# Patient Record
Sex: MALE | Race: WHITE | NOT HISPANIC OR LATINO | Employment: FULL TIME | URBAN - METROPOLITAN AREA
[De-identification: names, ages, dates, MRNs, and addresses within clinical notes are randomized per-mention and may not be internally consistent; named-entity substitution may affect disease eponyms.]

---

## 2018-07-07 PROBLEM — E29.1 MALE HYPOGONADISM: Status: ACTIVE | Noted: 2018-01-11

## 2018-07-11 ENCOUNTER — OFFICE VISIT (OUTPATIENT)
Dept: FAMILY MEDICINE CLINIC | Facility: CLINIC | Age: 53
End: 2018-07-11
Payer: COMMERCIAL

## 2018-07-11 VITALS
TEMPERATURE: 97.8 F | BODY MASS INDEX: 27.16 KG/M2 | DIASTOLIC BLOOD PRESSURE: 70 MMHG | WEIGHT: 194 LBS | SYSTOLIC BLOOD PRESSURE: 104 MMHG | HEART RATE: 75 BPM | OXYGEN SATURATION: 98 % | HEIGHT: 71 IN

## 2018-07-11 DIAGNOSIS — E78.2 MIXED HYPERLIPIDEMIA: Primary | ICD-10-CM

## 2018-07-11 DIAGNOSIS — E29.1 MALE HYPOGONADISM: ICD-10-CM

## 2018-07-11 DIAGNOSIS — I10 ESSENTIAL HYPERTENSION: ICD-10-CM

## 2018-07-11 PROBLEM — E66.3 OVERWEIGHT (BMI 25.0-29.9): Status: ACTIVE | Noted: 2018-07-11

## 2018-07-11 PROCEDURE — 3074F SYST BP LT 130 MM HG: CPT | Performed by: FAMILY MEDICINE

## 2018-07-11 PROCEDURE — 3078F DIAST BP <80 MM HG: CPT | Performed by: FAMILY MEDICINE

## 2018-07-11 PROCEDURE — 99214 OFFICE O/P EST MOD 30 MIN: CPT | Performed by: FAMILY MEDICINE

## 2018-07-11 RX ORDER — TESTOSTERONE 12.5 MG/1.25G
GEL TOPICAL
COMMUNITY
Start: 2018-06-24 | End: 2020-09-28 | Stop reason: SDUPTHER

## 2018-07-11 RX ORDER — ATORVASTATIN CALCIUM 20 MG/1
TABLET, FILM COATED ORAL
COMMUNITY
Start: 2018-03-19 | End: 2018-09-01 | Stop reason: SDUPTHER

## 2018-07-11 RX ORDER — LISINOPRIL 5 MG/1
TABLET ORAL EVERY 24 HOURS
COMMUNITY
Start: 2018-01-11 | End: 2018-07-11 | Stop reason: ALTCHOICE

## 2018-07-11 NOTE — ASSESSMENT & PLAN NOTE
Chronic with controlled continue current medication  Patient been taken half tablet of the lisinopril and the blood pressure 104/70 we will stop the lisinopril   low-salt diet less than 2 g a day ,   low caffeine intake   regular aerobic exercise 20 to totally minute a day diet and important lose weight discussed with the patient

## 2018-07-11 NOTE — ASSESSMENT & PLAN NOTE
Chronic ,fair control on current medication    Advised to maintain a low-fat low-cholesterol diet consult regarding potential comorbidity including cardiovascular disease consult regarding important weight loss

## 2018-07-11 NOTE — PROGRESS NOTES
Assessment/Plan:    Essential hypertension  Chronic with controlled continue current medication  Patient been taken half tablet of the lisinopril and the blood pressure 104/70 we will stop the lisinopril   low-salt diet less than 2 g a day ,   low caffeine intake   regular aerobic exercise 20 to totally minute a day diet and important lose weight discussed with the patient      Hyperlipidemia  Chronic ,fair control on current medication  Advised to maintain a low-fat low-cholesterol diet consult regarding potential comorbidity including cardiovascular disease consult regarding important weight loss      Male hypogonadism  Chronic well-controlled on testosterone supplement    Overweight (BMI 25 0-29  9)  Patient lost 3 lb from the last visit we encouraged to continue his lifestyle modification       Diagnoses and all orders for this visit:    Mixed hyperlipidemia  -     CBC and differential; Future  -     Comprehensive metabolic panel; Future  -     Lipid panel; Future  -     TSH baseline; Future    Essential hypertension  -     CBC and differential; Future  -     Comprehensive metabolic panel; Future  -     Lipid panel; Future  -     TSH baseline; Future    Male hypogonadism  -     CBC and differential; Future  -     Comprehensive metabolic panel; Future  -     Lipid panel; Future  -     TSH baseline; Future    Other orders  -     atorvastatin (LIPITOR) 20 mg tablet; TAKE ONE TABLET BY MOUTH EVERY DAY  -     Discontinue: lisinopril (ZESTRIL) 5 mg tablet; every 24 hours  -     Testosterone 12 5 MG/ACT (1%) GEL; Subjective:   Chief Complaint   Patient presents with    Follow-up     chronic condition        Patient ID: Candace Kim is a 46 y o  male      Patient here for follow-up is a chronic condition he had history of hyperlipidemia on atorvastatin 20 mg asymptomatic denies any chest pain short of breath no palpitation The patient has long history of hypertension the blood pressure today is in control patient tolerates medication well and no chest pain or short of breath no palpitation no headache patient has been working on his diet and exercise patient lost 3 lb from the last visit patient was hypogonadism on testosterone supplement and may tolerate med no side effect        The following portions of the patient's history were reviewed and updated as appropriate: allergies, current medications, past family history, past medical history, past social history, past surgical history and problem list     Review of Systems   Constitutional: Negative for fatigue and fever  HENT: Negative for ear pain, sinus pain, sinus pressure and sore throat  Eyes: Negative for pain and redness  Respiratory: Negative for cough, chest tightness and shortness of breath  Cardiovascular: Negative for chest pain, palpitations and leg swelling  Gastrointestinal: Negative for abdominal pain, blood in stool, constipation, diarrhea and nausea  Genitourinary: Negative for flank pain, frequency and hematuria  Musculoskeletal: Negative for back pain and joint swelling  Skin: Negative for rash  Neurological: Negative for dizziness, numbness and headaches  Hematological: Does not bruise/bleed easily  Objective:  Vitals:    07/11/18 0811   BP: 104/70   BP Location: Left arm   Patient Position: Sitting   Cuff Size: Large   Pulse: 75   Temp: 97 8 °F (36 6 °C)   TempSrc: Oral   SpO2: 98%   Weight: 88 kg (194 lb)   Height: 5' 11" (1 803 m)      Physical Exam   Constitutional: He is oriented to person, place, and time  He appears well-developed and well-nourished  HENT:   Head: Normocephalic  Right Ear: External ear normal    Left Ear: External ear normal    Eyes: Right eye exhibits no discharge  Left eye exhibits no discharge  Neck: No JVD present  Cardiovascular: Normal rate, regular rhythm and normal heart sounds  Exam reveals no gallop  No murmur heard    Pulmonary/Chest: Effort normal  No respiratory distress  He has no wheezes  He has no rales  He exhibits no tenderness  Abdominal: He exhibits no mass  There is no tenderness  There is no rebound  Musculoskeletal: He exhibits no edema or tenderness  Neurological: He is alert and oriented to person, place, and time

## 2018-07-11 NOTE — PATIENT INSTRUCTIONS

## 2018-09-01 DIAGNOSIS — E78.2 MIXED HYPERLIPIDEMIA: Primary | ICD-10-CM

## 2018-09-03 RX ORDER — ATORVASTATIN CALCIUM 20 MG/1
TABLET, FILM COATED ORAL
Qty: 90 TABLET | Refills: 0 | Status: SHIPPED | OUTPATIENT
Start: 2018-09-03 | End: 2018-12-02 | Stop reason: SDUPTHER

## 2018-09-27 ENCOUNTER — OFFICE VISIT (OUTPATIENT)
Dept: FAMILY MEDICINE CLINIC | Facility: CLINIC | Age: 53
End: 2018-09-27
Payer: COMMERCIAL

## 2018-09-27 VITALS
OXYGEN SATURATION: 97 % | DIASTOLIC BLOOD PRESSURE: 78 MMHG | HEIGHT: 71 IN | SYSTOLIC BLOOD PRESSURE: 112 MMHG | BODY MASS INDEX: 26.74 KG/M2 | TEMPERATURE: 98.3 F | RESPIRATION RATE: 16 BRPM | HEART RATE: 74 BPM | WEIGHT: 191 LBS

## 2018-09-27 DIAGNOSIS — Z23 NEED FOR INFLUENZA VACCINATION: ICD-10-CM

## 2018-09-27 DIAGNOSIS — F41.8 SITUATIONAL ANXIETY: ICD-10-CM

## 2018-09-27 DIAGNOSIS — Z00.01 ENCOUNTER FOR ROUTINE ADULT HEALTH EXAMINATION WITH ABNORMAL FINDINGS: Primary | ICD-10-CM

## 2018-09-27 PROCEDURE — 90471 IMMUNIZATION ADMIN: CPT

## 2018-09-27 PROCEDURE — 99396 PREV VISIT EST AGE 40-64: CPT | Performed by: FAMILY MEDICINE

## 2018-09-27 PROCEDURE — 90682 RIV4 VACC RECOMBINANT DNA IM: CPT

## 2018-09-27 RX ORDER — ALPRAZOLAM 0.25 MG/1
0.25 TABLET ORAL
Qty: 30 TABLET | Refills: 0 | Status: SHIPPED | OUTPATIENT
Start: 2018-09-27 | End: 2019-01-28 | Stop reason: ALTCHOICE

## 2018-09-27 NOTE — ASSESSMENT & PLAN NOTE
Symptomatic start exam neck is 0 25 mg 1 tablet p r n  proper use of medication possible side effect discussed with the patient

## 2018-09-27 NOTE — PROGRESS NOTES
[facility] HEALTH MAINTENANCE OFFICE VISIT  [facility] [place] PRIMARY CARE [facility]    NAME: Nain Mendez  AGE: 46 y o  SEX: male  : 1965     DATE: 2018    Assessment and Plan     Problem List Items Addressed This Visit     Situational anxiety     Symptomatic start exam neck is 0 25 mg 1 tablet p r n  proper use of medication possible side effect discussed with the patient         Relevant Medications    ALPRAZolam (XANAX) 0 25 mg tablet      Other Visit Diagnoses     Encounter for routine adult health examination with abnormal findings    -  Primary    The patient will have a flu shot today recommend will hydration increased activity sunscreen    Need for influenza vaccination        Relevant Orders    influenza vaccine, [id number], quadrivalent, recombinant, PF, 0 5 mL, for patients 18 yr+ (FLUBLOK) (Completed)    BMI 26 0-26 9,adult        The uncontrolled will recommend patient healthy diet and increase activity 30 minutes a day 5 days a week            · Patient Counseling:   · Nutrition: Stressed importance of a well balanced diet, moderation of sodium/saturated fat, caloric balance and sufficient intake of fiber  · Exercise: Stressed the importance of regular exercise with a goal of 150 minutes per week  · Dental Health: Discussed daily flossing and brushing and regular dental visits     · Immunizations reviewed patient due for flu shot  · Discussed benefits of screening patient is up-to-date with colonoscopy  · Discussed the patient's BMI with him    The BMI is above average; BMI management plan is completed            Chief Complaint     Chief Complaint   Patient presents with    Physical Exam     Physical Exam        History of Present Illness     Patient here for his annual exam patient deny any chest pain short of breath no palpitation no headache no blurred vision no weakness or lateralized of the symptom no abdomen pain nausea vomiting or diarrhea no renal problem no rash no fever no change in the weight patient he is not smoker he is up-to-date with colonoscopy has been done in December 2017 the been told is good for 10 years  Patient is been going through some stressful the time of his life affecting his sleeping he is going through divorce and patient having tough time to fall asleep and when he sleeps he get up a couple times during the night he deny any any suicidal attempt or idea and deny any tremor no jittery no palpitation no sweating        Well Adult Physical   Patient here for a comprehensive physical exam       Diet and Physical Activity  Diet: well balanced diet  Weight concerns: Patient is overweight (BMI 25 0-29  9)  Exercise: infrequently      Depression Screen  PHQ-9 Depression Screening    PHQ-9:    Frequency of the following problems over the past two weeks:       Little interest or pleasure in doing things:  0 - not at all  Feeling down, depressed, or hopeless:  0 - not at all  PHQ-2 Score:  0          General Health  Hearing: Normal:  bilateral  Vision: wears glasses  Dental: regular dental visits        The following portions of the patient's history were reviewed and updated as appropriate: allergies, current medications, past family history, past medical history, past social history, past surgical history and problem list     Review of Systems     Review of Systems   Constitutional: Negative for fatigue and fever  HENT: Negative for ear pain, sinus pain, sinus pressure and sore throat  Eyes: Negative for pain and redness  Respiratory: Negative for cough, chest tightness and shortness of breath  Cardiovascular: Negative for chest pain, palpitations and leg swelling  Gastrointestinal: Negative for abdominal pain, blood in stool, constipation, diarrhea and nausea  Genitourinary: Negative for flank pain, frequency and hematuria  Musculoskeletal: Negative for back pain and joint swelling  Skin: Negative for rash  Neurological: Negative for dizziness, numbness and headaches  Hematological: Does not bruise/bleed easily  Psychiatric/Behavioral: Negative for behavioral problems  Past Medical History     Past Medical History:   Diagnosis Date    Allergic     Hypertension     Situational anxiety 9/27/2018       Past Surgical History     Past Surgical History:   Procedure Laterality Date    ANTERIOR CRUCIATE LIGAMENT REPAIR Left     SHOULDER SURGERY Right     R SHOULDER IMPINGEMENT SURGERY       Social History     Social History     Social History    Marital status: Single     Spouse name: N/A    Number of children: N/A    Years of education: N/A     Social History Main Topics    Smoking status: Never Smoker    Smokeless tobacco: Never Used    Alcohol use Yes    Drug use: No    Sexual activity: Not Asked     Other Topics Concern    None     Social History Narrative    MARITAL STATUS:  AS PER NEXTGEN    HAS CHILDREN    HAND DOMINANCE: RIGHT       Family History     Family History   Problem Relation Age of Onset    Hypertension Mother     Hyperlipidemia Mother     Colon cancer Father        Current Medications       Current Outpatient Prescriptions:     ALPRAZolam (XANAX) 0 25 mg tablet, Take 1 tablet (0 25 mg total) by mouth daily at bedtime as needed for anxiety, Disp: 30 tablet, Rfl: 0    atorvastatin (LIPITOR) 20 mg tablet, TAKE 1 TABLET DAILY, Disp: 90 tablet, Rfl: 0    Multiple Vitamin (MULTI-VITAMIN DAILY PO), Take 1 capsule by mouth, Disp: , Rfl:     Testosterone 12 5 MG/ACT (1%) GEL, , Disp: , Rfl:      Allergies     Allergies   Allergen Reactions    No Known Allergies        Objective     /78 (BP Location: Left arm, Patient Position: Sitting, Cuff Size: Large)   Pulse 74   Temp 98 3 °F (36 8 °C) (Oral)   Resp 16   Ht 5' 11" (1 803 m)   Wt 86 6 kg (191 lb)   SpO2 97%   BMI 26 64 kg/m²      Physical Exam   Constitutional: He is oriented to person, place, and time   He appears well-developed and well-nourished  HENT:   Head: Normocephalic  Right Ear: External ear normal    Left Ear: External ear normal    Eyes: Conjunctivae and EOM are normal  Right eye exhibits no discharge  Left eye exhibits no discharge  Neck: No JVD present  Cardiovascular: Normal rate, regular rhythm and normal heart sounds  Exam reveals no gallop  No murmur heard  Pulmonary/Chest: Effort normal  No respiratory distress  He has no wheezes  He has no rales  He exhibits no tenderness  Abdominal: He exhibits no mass  There is no tenderness  There is no rebound  Musculoskeletal: He exhibits no edema or tenderness  Neurological: He is alert and oriented to person, place, and time  Skin: No rash noted  No erythema             Health Maintenance     Health Maintenance   Topic Date Due    INFLUENZA VACCINE  09/01/2018    Depression Screening PHQ  07/11/2019    CRC Screening: Colonoscopy  12/27/2022    DTaP,Tdap,and Td Vaccines (2 - Td) 08/08/2026     Immunization History   Administered Date(s) Administered    Influenza 10/11/2017    Influenza, recombinant, quadrivalent,injectable, preservative free 09/27/2018    Tdap 08/08/2016       Aaliyah Nieto MD  [address] [place] [address]

## 2018-09-27 NOTE — PATIENT INSTRUCTIONS

## 2018-10-03 DIAGNOSIS — I10 ESSENTIAL HYPERTENSION: Primary | ICD-10-CM

## 2018-10-03 RX ORDER — LISINOPRIL 10 MG/1
TABLET ORAL
Qty: 90 TABLET | Refills: 1 | Status: SHIPPED | OUTPATIENT
Start: 2018-10-03 | End: 2019-01-28 | Stop reason: ALTCHOICE

## 2018-12-02 DIAGNOSIS — E78.2 MIXED HYPERLIPIDEMIA: ICD-10-CM

## 2018-12-02 RX ORDER — ATORVASTATIN CALCIUM 20 MG/1
TABLET, FILM COATED ORAL
Qty: 90 TABLET | Refills: 0 | Status: SHIPPED | OUTPATIENT
Start: 2018-12-02 | End: 2019-03-02 | Stop reason: SDUPTHER

## 2019-01-28 ENCOUNTER — OFFICE VISIT (OUTPATIENT)
Dept: FAMILY MEDICINE CLINIC | Facility: CLINIC | Age: 54
End: 2019-01-28
Payer: COMMERCIAL

## 2019-01-28 VITALS
BODY MASS INDEX: 27.58 KG/M2 | WEIGHT: 197 LBS | SYSTOLIC BLOOD PRESSURE: 114 MMHG | HEART RATE: 73 BPM | TEMPERATURE: 97.5 F | DIASTOLIC BLOOD PRESSURE: 72 MMHG | HEIGHT: 71 IN | RESPIRATION RATE: 16 BRPM

## 2019-01-28 DIAGNOSIS — I10 ESSENTIAL HYPERTENSION: ICD-10-CM

## 2019-01-28 DIAGNOSIS — E87.5 HYPERKALEMIA: ICD-10-CM

## 2019-01-28 DIAGNOSIS — R10.13 DYSPEPSIA: Primary | ICD-10-CM

## 2019-01-28 DIAGNOSIS — E78.5 HYPERLIPIDEMIA, UNSPECIFIED HYPERLIPIDEMIA TYPE: ICD-10-CM

## 2019-01-28 DIAGNOSIS — Z11.59 NEED FOR HEPATITIS C SCREENING TEST: ICD-10-CM

## 2019-01-28 DIAGNOSIS — E29.1 MALE HYPOGONADISM: ICD-10-CM

## 2019-01-28 PROCEDURE — 3074F SYST BP LT 130 MM HG: CPT | Performed by: FAMILY MEDICINE

## 2019-01-28 PROCEDURE — 3078F DIAST BP <80 MM HG: CPT | Performed by: FAMILY MEDICINE

## 2019-01-28 PROCEDURE — 3008F BODY MASS INDEX DOCD: CPT | Performed by: FAMILY MEDICINE

## 2019-01-28 PROCEDURE — 1036F TOBACCO NON-USER: CPT | Performed by: FAMILY MEDICINE

## 2019-01-28 PROCEDURE — 99214 OFFICE O/P EST MOD 30 MIN: CPT | Performed by: FAMILY MEDICINE

## 2019-01-28 NOTE — ASSESSMENT & PLAN NOTE
Chronic well controlled continue current diet patient off of lisinopril blood pressure well controlled low-salt diet less than 2 g a day ,   low caffeine intake   regular aerobic exercise 20 to totally minute a day diet and important lose weight discussed with the patient

## 2019-01-28 NOTE — PROGRESS NOTES
Subjective:   Chief Complaint   Patient presents with    Follow-up     chronic conditions        Patient ID: Benja Woo is a 46 y o  male  Patient here follow-up with a chronic congestion and the concerned about the discomfort in the epigastric area sometimes happen after certain food spicy food and the start burping and bloating patient does also drink loss of caffeine up to 10 cups a day and he deny any nausea vomiting no diarrhea no weight change and no fever  The patient was history of hyperlipidemia on atorvastatin 20 mg once a day tolerated well without any rash or muscle pain deny any chest pain short of breath no palpitation no headache no blurred vision no weakness or lateralized of the symptom patient's history of hypertension he is off of the lisinopril blood pressure was well control asymptomatic no chest pain no short of breast and no headache recent blood work discussed with the patient        The following portions of the patient's history were reviewed and updated as appropriate: allergies, current medications, past family history, past medical history, past social history, past surgical history and problem list     Review of Systems   Constitutional: Negative for fatigue and fever  HENT: Negative for ear pain, sinus pain, sinus pressure and sore throat  Eyes: Negative for pain and redness  Respiratory: Negative for cough, chest tightness and shortness of breath  Cardiovascular: Negative for chest pain, palpitations and leg swelling  Gastrointestinal: Negative for abdominal pain, blood in stool, constipation, diarrhea and nausea  Genitourinary: Negative for flank pain, frequency and hematuria  Musculoskeletal: Negative for back pain and joint swelling  Skin: Negative for rash  Neurological: Negative for dizziness, numbness and headaches  Hematological: Does not bruise/bleed easily           Objective:  Vitals:    01/28/19 0731   BP: 114/72   BP Location: Left arm Patient Position: Sitting   Cuff Size: Large   Pulse: 73   Resp: 16   Temp: 97 5 °F (36 4 °C)   TempSrc: Oral   Weight: 89 4 kg (197 lb)   Height: 5' 11" (1 803 m)      Physical Exam   Constitutional: He is oriented to person, place, and time  He appears well-developed and well-nourished  HENT:   Head: Normocephalic  Right Ear: External ear normal    Left Ear: External ear normal    Eyes: Conjunctivae and EOM are normal  Right eye exhibits no discharge  Left eye exhibits no discharge  Neck: No JVD present  Cardiovascular: Normal rate, regular rhythm and normal heart sounds  Exam reveals no gallop  No murmur heard  Pulmonary/Chest: Effort normal  No respiratory distress  He has no wheezes  He has no rales  He exhibits no tenderness  Abdominal: He exhibits no mass  There is no tenderness  There is no rebound  Musculoskeletal: He exhibits no edema or tenderness  Neurological: He is alert and oriented to person, place, and time  Skin: No rash noted  No erythema  Assessment/Plan:    Essential hypertension  Chronic well controlled continue current diet patient off of lisinopril blood pressure well controlled low-salt diet less than 2 g a day ,   low caffeine intake   regular aerobic exercise 20 to totally minute a day diet and important lose weight discussed with the patient      Hyperlipidemia  Chronic ,fair control on current medication    Atorvastatin 20 mg once a day  Advised to maintain a low-fat low-cholesterol diet consult regarding potential comorbidity including cardiovascular disease consult regarding important weight loss      Dyspepsia  Symptomatic new onset we discussed with the patient the to take Tums the at the time of the symptom  Discuss with pt important lose weight   Multiple small meal ,avoid eat and lying down ,avoid spicy food and avoid provoked food  If symptom persistent to call  office         Male hypogonadism  Chronic fair control currently on testosterone gel tolerated well without any side effects continue current management       Diagnoses and all orders for this visit:    Dyspepsia  -     CBC and differential; Future  -     Comprehensive metabolic panel; Future  -     Lipid panel; Future  -     TSH, 3rd generation with Free T4 reflex; Future    Essential hypertension  -     CBC and differential; Future  -     Comprehensive metabolic panel; Future  -     Lipid panel; Future  -     TSH, 3rd generation with Free T4 reflex; Future    Need for hepatitis C screening test  -     Hepatitis C antibody; Future    Male hypogonadism  -     CBC and differential; Future  -     Comprehensive metabolic panel; Future  -     Lipid panel; Future  -     TSH, 3rd generation with Free T4 reflex; Future    Hyperkalemia  -     CBC and differential; Future  -     Comprehensive metabolic panel; Future  -     Lipid panel; Future  -     TSH, 3rd generation with Free T4 reflex; Future    Hyperlipidemia, unspecified hyperlipidemia type  -     CBC and differential; Future  -     Comprehensive metabolic panel; Future  -     Lipid panel; Future  -     TSH, 3rd generation with Free T4 reflex;  Future

## 2019-01-28 NOTE — ASSESSMENT & PLAN NOTE
Symptomatic new onset we discussed with the patient the to take Tums the at the time of the symptom  Discuss with pt important lose weight   Multiple small meal ,avoid eat and lying down ,avoid spicy food and avoid provoked food  If symptom persistent to call  office

## 2019-01-28 NOTE — ASSESSMENT & PLAN NOTE
Chronic ,fair control on current medication    Atorvastatin 20 mg once a day  Advised to maintain a low-fat low-cholesterol diet consult regarding potential comorbidity including cardiovascular disease consult regarding important weight loss

## 2019-01-28 NOTE — ASSESSMENT & PLAN NOTE
Chronic fair control currently on testosterone gel tolerated well without any side effects continue current management

## 2019-03-02 DIAGNOSIS — E78.2 MIXED HYPERLIPIDEMIA: ICD-10-CM

## 2019-03-03 RX ORDER — ATORVASTATIN CALCIUM 20 MG/1
TABLET, FILM COATED ORAL
Qty: 90 TABLET | Refills: 0 | Status: SHIPPED | OUTPATIENT
Start: 2019-03-03 | End: 2019-05-31 | Stop reason: SDUPTHER

## 2019-04-01 RX ORDER — LISINOPRIL 10 MG/1
TABLET ORAL
Qty: 90 TABLET | Refills: 1 | OUTPATIENT
Start: 2019-04-01

## 2019-05-31 DIAGNOSIS — E78.2 MIXED HYPERLIPIDEMIA: ICD-10-CM

## 2019-05-31 RX ORDER — ATORVASTATIN CALCIUM 20 MG/1
TABLET, FILM COATED ORAL
Qty: 90 TABLET | Refills: 0 | Status: SHIPPED | OUTPATIENT
Start: 2019-05-31 | End: 2019-08-29 | Stop reason: SDUPTHER

## 2019-08-29 DIAGNOSIS — E78.2 MIXED HYPERLIPIDEMIA: ICD-10-CM

## 2019-08-29 RX ORDER — ATORVASTATIN CALCIUM 20 MG/1
TABLET, FILM COATED ORAL
Qty: 90 TABLET | Refills: 0 | Status: SHIPPED | OUTPATIENT
Start: 2019-08-29 | End: 2019-11-27 | Stop reason: SDUPTHER

## 2019-09-09 LAB — HCV AB SER-ACNC: NEGATIVE

## 2019-09-27 ENCOUNTER — OFFICE VISIT (OUTPATIENT)
Dept: FAMILY MEDICINE CLINIC | Facility: CLINIC | Age: 54
End: 2019-09-27
Payer: COMMERCIAL

## 2019-09-27 VITALS
TEMPERATURE: 96.6 F | OXYGEN SATURATION: 96 % | BODY MASS INDEX: 28.28 KG/M2 | RESPIRATION RATE: 16 BRPM | HEART RATE: 59 BPM | DIASTOLIC BLOOD PRESSURE: 62 MMHG | HEIGHT: 71 IN | SYSTOLIC BLOOD PRESSURE: 124 MMHG | WEIGHT: 202 LBS

## 2019-09-27 DIAGNOSIS — Z23 NEED FOR INFLUENZA VACCINATION: ICD-10-CM

## 2019-09-27 DIAGNOSIS — Z00.01 ENCOUNTER FOR WELL ADULT EXAM WITH ABNORMAL FINDINGS: Primary | ICD-10-CM

## 2019-09-27 DIAGNOSIS — Z80.0 FAMILY HISTORY OF COLON CANCER IN FATHER: ICD-10-CM

## 2019-09-27 DIAGNOSIS — M25.562 CHRONIC PAIN OF LEFT KNEE: ICD-10-CM

## 2019-09-27 DIAGNOSIS — E78.2 MIXED HYPERLIPIDEMIA: ICD-10-CM

## 2019-09-27 DIAGNOSIS — I10 ESSENTIAL HYPERTENSION: ICD-10-CM

## 2019-09-27 DIAGNOSIS — R10.13 DYSPEPSIA: ICD-10-CM

## 2019-09-27 DIAGNOSIS — G89.29 CHRONIC PAIN OF LEFT KNEE: ICD-10-CM

## 2019-09-27 PROCEDURE — 90471 IMMUNIZATION ADMIN: CPT | Performed by: FAMILY MEDICINE

## 2019-09-27 PROCEDURE — 3074F SYST BP LT 130 MM HG: CPT | Performed by: FAMILY MEDICINE

## 2019-09-27 PROCEDURE — 90686 IIV4 VACC NO PRSV 0.5 ML IM: CPT | Performed by: FAMILY MEDICINE

## 2019-09-27 PROCEDURE — 99396 PREV VISIT EST AGE 40-64: CPT | Performed by: FAMILY MEDICINE

## 2019-09-27 PROCEDURE — 3078F DIAST BP <80 MM HG: CPT | Performed by: FAMILY MEDICINE

## 2019-09-27 PROCEDURE — 3008F BODY MASS INDEX DOCD: CPT | Performed by: FAMILY MEDICINE

## 2019-09-27 PROCEDURE — 99214 OFFICE O/P EST MOD 30 MIN: CPT | Performed by: FAMILY MEDICINE

## 2019-09-27 NOTE — ASSESSMENT & PLAN NOTE
Advice and education were given regarding nutrition, aerobic exercises, weight bearing exercises, cardiovascular risk reduction, fall risk reduction, and age appropriate supplements  The patient was counseled regarding instructions for management, risk factor reductions, prognosis, risks and benefits of treatment options, patient and family education, and importance of compliance with treatment     Patient is up-to-date with the colonoscopy screening for colon cancer also patient is up-to-date with his PSA follow-up by Urology

## 2019-09-27 NOTE — PATIENT INSTRUCTIONS

## 2019-09-27 NOTE — ASSESSMENT & PLAN NOTE
Chronic asymptomatic fair control with the low-salt diet encouraged patient to continue and important lose weight increased physical activity

## 2019-09-27 NOTE — ASSESSMENT & PLAN NOTE
Chronic asymptomatic fair control with current diet encouraged patient the avoid provoke food do not eat and lie down

## 2019-09-27 NOTE — ASSESSMENT & PLAN NOTE
Chronic asymptomatic fair control continue current dose of statin encouraged patient to follow up with the low-fat diet and important lose weight

## 2019-09-27 NOTE — PROGRESS NOTES
FAMILY PRACTICE HEALTH MAINTENANCE OFFICE VISIT  [facility] [place] [facility]    NAME: Brenda Herrera  AGE: 53 y o  SEX: male  : 1965     DATE: 2019    Assessment and Plan     1  Encounter for well adult exam with abnormal findings  Assessment & Plan:  Advice and education were given regarding nutrition, aerobic exercises, weight bearing exercises, cardiovascular risk reduction, fall risk reduction, and age appropriate supplements  The patient was counseled regarding instructions for management, risk factor reductions, prognosis, risks and benefits of treatment options, patient and family education, and importance of compliance with treatment  Patient is up-to-date with the colonoscopy screening for colon cancer also patient is up-to-date with his PSA follow-up by Urology        2  Chronic pain of left knee  Assessment & Plan:  New diagnosis symptomatic start the Voltaren gel proper use discussed with the patient will do x-ray of the left knee discussed the patient important lose weight    Orders:  -     diclofenac sodium (VOLTAREN) 1 %; Apply 2 g topically 4 (four) times a day  -     XR knee 3 vw left non injury; Future; Expected date: 2019    3  Mixed hyperlipidemia  Assessment & Plan:  Chronic asymptomatic fair control continue current dose of statin encouraged patient to follow up with the low-fat diet and important lose weight    Orders:  -     CBC and differential; Future; Expected date: 2020  -     Comprehensive metabolic panel; Future; Expected date: 2020  -     Lipid panel; Future; Expected date: 2020  -     TSH, 3rd generation with Free T4 reflex; Future; Expected date: 2020  -     XR knee 3 vw left non injury; Future; Expected date: 2019    4   Dyspepsia  Assessment & Plan:  Chronic asymptomatic fair control with current diet encouraged patient the avoid provoke food do not eat and lie down    Orders:  - CBC and differential; Future; Expected date: 03/09/2020  -     Comprehensive metabolic panel; Future; Expected date: 03/09/2020  -     Lipid panel; Future; Expected date: 03/09/2020  -     TSH, 3rd generation with Free T4 reflex; Future; Expected date: 03/09/2020  -     XR knee 3 vw left non injury; Future; Expected date: 09/27/2019    5  Essential hypertension  Assessment & Plan:  Chronic asymptomatic fair control with the low-salt diet encouraged patient to continue and important lose weight increased physical activity    Orders:  -     CBC and differential; Future; Expected date: 03/09/2020  -     Comprehensive metabolic panel; Future; Expected date: 03/09/2020  -     Lipid panel; Future; Expected date: 03/09/2020  -     TSH, 3rd generation with Free T4 reflex; Future; Expected date: 03/09/2020  -     XR knee 3 vw left non injury; Future; Expected date: 09/27/2019    6  BMI 28 0-28 9,adult  Assessment & Plan:  The BMI is above average  BMI counseling and education was provided to the patient  Nutrition recommendations include reducing portion sizes, decreasing overall calorie intake, 3-5 servings of fruits/vegetables daily, reducing fast food intake, consuming healthier snacks, decreasing soda and/or juice intake, moderation in carbohydrate intake and reducing intake of saturated fat and trans fat  Exercise recommendations include moderate aerobic physical activity for 150 minutes/week, exercising 3-5 times per week and joining a gym  7  Family history of colon cancer in father    6   Need for influenza vaccination  -     FLUZONE: influenza vaccine, quadrivalent, 0 5 mL          · Patient Counseling:   · Nutrition: Stressed importance of a well balanced diet, moderation of sodium/saturated fat, caloric balance and sufficient intake of fiber  · Exercise: Stressed the importance of regular exercise with a goal of 150 minutes per week  · Dental Health: Discussed daily flossing and brushing and regular dental visits     · Immunizations reviewed: Risks and Benefits discussed  · Discussed benefits of:  Colon Cancer Screening and Prostate Cancer Screening    BMI Counseling: Body mass index is 28 17 kg/m²  Discussed with patient's BMI with him  Chief Complaint     Chief Complaint   Patient presents with    Physical Exam     Yearly Physical Exam     Follow-up     chronic conditions       History of Present Illness     The patient here for his annual physical exam follow-up with a chronic condition  Patient deny any chest pain short of breath no palpitation no cough no wheezing and no headache no numbness no weakness or lateralized of the symptom deny any abdomen pain nausea vomiting or diarrhea no renal problem no rash no fever no change in the weight and no change in the mood patient is not smoker the patient try to eat healthy diet and he does do exercise 2 to 3 times a week the patient does family history of colon cancer he already had a colonoscopy      Well Adult Physical   Patient here for a comprehensive physical exam       Diet and Physical Activity  Diet: Regular diet  Exercise: frequently      Depression Screen  PHQ-9 Depression Screening    PHQ-9:    Frequency of the following problems over the past two weeks:       Little interest or pleasure in doing things:  0 - not at all  Feeling down, depressed, or hopeless:  0 - not at all  PHQ-2 Score:  0          General Health  Hearing: Normal:  bilateral  Vision: no vision problems  Dental: regular dental visits    The following portions of the patient's history were reviewed and updated as appropriate: allergies, current medications, past family history, past medical history, past social history, past surgical history and problem list     Review of Systems     Review of Systems   Constitutional: Negative for fatigue and fever  HENT: Negative for ear pain, sinus pressure, sinus pain and sore throat  Eyes: Negative for pain and redness     Respiratory: Negative for cough, chest tightness and shortness of breath  Cardiovascular: Negative for chest pain, palpitations and leg swelling  Gastrointestinal: Negative for abdominal pain, blood in stool, constipation, diarrhea and nausea  Endocrine: Negative for polydipsia  Genitourinary: Negative for flank pain, frequency and hematuria  Musculoskeletal: Positive for arthralgias  Negative for back pain and joint swelling  Left knee pain   Skin: Negative for rash  Neurological: Negative for dizziness, numbness and headaches  Hematological: Does not bruise/bleed easily  Psychiatric/Behavioral: Negative for agitation and behavioral problems         Past Medical History     Past Medical History:   Diagnosis Date    Allergic     Hypertension     Situational anxiety 9/27/2018       Past Surgical History     Past Surgical History:   Procedure Laterality Date    ANTERIOR CRUCIATE LIGAMENT REPAIR Left     SHOULDER SURGERY Right     R SHOULDER IMPINGEMENT SURGERY       Social History     Social History     Socioeconomic History    Marital status: Single     Spouse name: None    Number of children: None    Years of education: None    Highest education level: None   Occupational History    None   Social Needs    Financial resource strain: Not hard at all   Shamar-Laura insecurity:     Worry: Never true     Inability: Never true    Transportation needs:     Medical: No     Non-medical: No   Tobacco Use    Smoking status: Never Smoker    Smokeless tobacco: Never Used   Substance and Sexual Activity    Alcohol use: Yes     Frequency: Monthly or less     Drinks per session: 1 or 2     Binge frequency: Never     Comment: occasional    Drug use: No    Sexual activity: Yes     Partners: Female   Lifestyle    Physical activity:     Days per week: 4 days     Minutes per session: 60 min    Stress: Not at all   Relationships    Social connections:     Talks on phone: More than three times a week     Gets together: More than three times a week     Attends [employer] service: More than 4 times per year     Active member of club or organization: No     Attends meetings of clubs or organizations: Never     Relationship status:     Intimate partner violence:     Fear of current or ex partner: No     Emotionally abused: No     Physically abused: No     Forced sexual activity: No   Other Topics Concern    None   Social History Narrative    MARITAL STATUS:  AS PER NEXTGEN    HAS CHILDREN    HAND DOMINANCE: RIGHT       Family History     Family History   Problem Relation Age of Onset    Hypertension Mother     Hyperlipidemia Mother     Colon cancer Father        Current Medications       Current Outpatient Medications:     atorvastatin (LIPITOR) 20 mg tablet, TAKE 1 TABLET DAILY, Disp: 90 tablet, Rfl: 0    diclofenac sodium (VOLTAREN) 1 %, Apply 2 g topically 4 (four) times a day, Disp: 100 g, Rfl: 1    Multiple Vitamin (MULTI-VITAMIN DAILY PO), Take 1 capsule by mouth, Disp: , Rfl:     Testosterone 12 5 MG/ACT (1%) GEL, , Disp: , Rfl:      Allergies     Allergies   Allergen Reactions    No Known Allergies        Objective     /62 (BP Location: Left arm, Patient Position: Sitting, Cuff Size: Large)   Pulse 59   Temp (!) 96 6 °F (35 9 °C) (Tympanic)   Resp 16   Ht 5' 11" (1 803 m)   Wt 91 6 kg (202 lb)   SpO2 96%   BMI 28 17 kg/m²      Physical Exam   Constitutional: He is oriented to person, place, and time  He appears well-developed and well-nourished  HENT:   Head: Normocephalic  Right Ear: External ear normal    Left Ear: External ear normal    Eyes: Conjunctivae and EOM are normal  Right eye exhibits no discharge  Left eye exhibits no discharge  Neck: No JVD present  Cardiovascular: Normal rate, regular rhythm and normal heart sounds  Exam reveals no gallop  No murmur heard  Pulmonary/Chest: Effort normal  No respiratory distress  He has no wheezes  He has no rales   He exhibits no tenderness  Abdominal: He exhibits no mass  There is no tenderness  There is no rebound  Musculoskeletal: He exhibits tenderness  He exhibits no edema  The positive tenderness on the left knee around the patella no erythema and no swelling no limited range of the motion   Neurological: He is alert and oriented to person, place, and time  Skin: No rash noted  No erythema  MD NASEEM Navarro [facility]  BMI Counseling: Body mass index is 28 17 kg/m²  The BMI is above normal  Nutrition recommendations include reducing portion sizes, decreasing overall calorie intake, 3-5 servings of fruits/vegetables daily, reducing fast food intake and consuming healthier snacks  Exercise recommendations include moderate aerobic physical activity for 150 minutes/week

## 2019-09-27 NOTE — PROGRESS NOTES
Subjective:   Chief Complaint   Patient presents with    Physical Exam     Yearly Physical Exam     Follow-up     chronic conditions        Patient ID: Mary Ann Dooley is a 53 y o  male  Patient and office for annual physical exam follow-up with a chronic condition and he had concerned about left knee pain he describes his pain as achy all day long worse at the end of the day worse when he go up and down the steps and no recent fall no recent trauma and no swelling and no limited range of the motion he graded his pain as a 5 6/10 and it has been chronic going more than 6 months no other joint pain or swelling he did the have the history of the injury and status post ACL repair in the left knee  Patient's history of hyperlipidemia on statin tolerated well deny any chest pain short of breath no palpitation no TIA symptom the patient's history of hypertension try to controlled with the low-salt diet deny any chest pain short of breath no palpitation no dyspnea on exertion and no lower extremity edema patient history of the dyspepsia try to controlled with diet no heartburn no abdomen pain nausea vomiting no weight change  Recent blood work discussed with the patient      The following portions of the patient's history were reviewed and updated as appropriate: allergies, current medications, past family history, past medical history, past social history, past surgical history and problem list     Review of Systems   Constitutional: Negative for fatigue and fever  HENT: Negative for ear pain, sinus pressure, sinus pain and sore throat  Eyes: Negative for pain and redness  Respiratory: Negative for cough, chest tightness and shortness of breath  Cardiovascular: Negative for chest pain, palpitations and leg swelling  Gastrointestinal: Negative for abdominal pain, blood in stool, constipation, diarrhea and nausea  Genitourinary: Negative for flank pain, frequency and hematuria     Musculoskeletal: Positive for arthralgias  Negative for back pain and joint swelling  Left knee pain   Skin: Negative for rash  Neurological: Negative for dizziness, numbness and headaches  Hematological: Does not bruise/bleed easily  Objective:  Vitals:    09/27/19 0741   BP: 124/62   BP Location: Left arm   Patient Position: Sitting   Cuff Size: Large   Pulse: 59   Resp: 16   Temp: (!) 96 6 °F (35 9 °C)   TempSrc: Tympanic   SpO2: 96%   Weight: 91 6 kg (202 lb)   Height: 5' 11" (1 803 m)      Physical Exam   Constitutional: He is oriented to person, place, and time  He appears well-developed and well-nourished  HENT:   Head: Normocephalic  Right Ear: External ear normal    Left Ear: External ear normal    Eyes: Conjunctivae and EOM are normal  Right eye exhibits no discharge  Left eye exhibits no discharge  Neck: No JVD present  Cardiovascular: Normal rate, regular rhythm and normal heart sounds  Exam reveals no gallop  No murmur heard  Pulmonary/Chest: Effort normal  No respiratory distress  He has no wheezes  He has no rales  He exhibits no tenderness  Abdominal: He exhibits no mass  There is no tenderness  There is no rebound  Musculoskeletal: He exhibits tenderness  He exhibits no edema  tenderness in the left knee around the patella area no erythema no swelling no limited range of the motion   Neurological: He is alert and oriented to person, place, and time  Skin: No rash noted  No erythema  Assessment/Plan:    Encounter for well adult exam with abnormal findings  Advice and education were given regarding nutrition, aerobic exercises, weight bearing exercises, cardiovascular risk reduction, fall risk reduction, and age appropriate supplements  The patient was counseled regarding instructions for management, risk factor reductions, prognosis, risks and benefits of treatment options, patient and family education, and importance of compliance with treatment     Patient is up-to-date with the colonoscopy screening for colon cancer also patient is up-to-date with his PSA follow-up by Urology      BMI 28 0-28 9,adult  The BMI is above average  BMI counseling and education was provided to the patient  Nutrition recommendations include reducing portion sizes, decreasing overall calorie intake, 3-5 servings of fruits/vegetables daily, reducing fast food intake, consuming healthier snacks, decreasing soda and/or juice intake, moderation in carbohydrate intake and reducing intake of saturated fat and trans fat  Exercise recommendations include moderate aerobic physical activity for 150 minutes/week, exercising 3-5 times per week and joining a gym  Chronic pain of left knee  New diagnosis symptomatic start the Voltaren gel proper use discussed with the patient will do x-ray of the left knee discussed the patient important lose weight    Essential hypertension  Chronic asymptomatic fair control with the low-salt diet encouraged patient to continue and important lose weight increased physical activity    Hyperlipidemia  Chronic asymptomatic fair control continue current dose of statin encouraged patient to follow up with the low-fat diet and important lose weight    Dyspepsia  Chronic asymptomatic fair control with current diet encouraged patient the avoid provoke food do not eat and lie down       Diagnoses and all orders for this visit:    Encounter for well adult exam with abnormal findings    Chronic pain of left knee  -     diclofenac sodium (VOLTAREN) 1 %; Apply 2 g topically 4 (four) times a day  -     XR knee 3 vw left non injury; Future    Mixed hyperlipidemia  -     CBC and differential; Future  -     Comprehensive metabolic panel; Future  -     Lipid panel; Future  -     TSH, 3rd generation with Free T4 reflex; Future  -     XR knee 3 vw left non injury; Future    Dyspepsia  -     CBC and differential; Future  -     Comprehensive metabolic panel; Future  -     Lipid panel;  Future  -     TSH, 3rd generation with Free T4 reflex; Future  -     XR knee 3 vw left non injury; Future    Essential hypertension  -     CBC and differential; Future  -     Comprehensive metabolic panel; Future  -     Lipid panel; Future  -     TSH, 3rd generation with Free T4 reflex; Future  -     XR knee 3 vw left non injury;  Future    BMI 28 0-28 9,adult    Family history of colon cancer in father    Need for influenza vaccination  -     FLUZONE: influenza vaccine, quadrivalent, 0 5 mL

## 2019-09-27 NOTE — ASSESSMENT & PLAN NOTE
New diagnosis symptomatic start the Voltaren gel proper use discussed with the patient will do x-ray of the left knee discussed the patient important lose weight

## 2019-10-03 ENCOUNTER — TELEPHONE (OUTPATIENT)
Dept: FAMILY MEDICINE CLINIC | Facility: CLINIC | Age: 54
End: 2019-10-03

## 2019-10-03 NOTE — TELEPHONE ENCOUNTER
Left message on answering machine to contact office regarding diflocenac gel insurance wants him to try mobic 7 5mg po daily first

## 2019-10-03 NOTE — TELEPHONE ENCOUNTER
gel is not covered must try Alternative suggest diclofenac sodium topical solution 150 ml 1 5% strengnth or sodium tabs, ibuprofen tabs or melaxicam please advise

## 2019-10-23 ENCOUNTER — TELEPHONE (OUTPATIENT)
Dept: FAMILY MEDICINE CLINIC | Facility: CLINIC | Age: 54
End: 2019-10-23

## 2019-10-23 DIAGNOSIS — G89.29 CHRONIC PAIN OF LEFT KNEE: Primary | ICD-10-CM

## 2019-10-23 DIAGNOSIS — M25.562 CHRONIC PAIN OF LEFT KNEE: Primary | ICD-10-CM

## 2019-10-23 NOTE — TELEPHONE ENCOUNTER
----- Message from Zak Rose MD sent at 10/21/2019 11:35 AM EDT -----  Osteoarthritis of the left knee plan for MRI

## 2019-10-28 NOTE — TELEPHONE ENCOUNTER
Incoming return call spoke with patient advised mri of knee has been authorized by insurance ok to schedule at [facility]

## 2019-11-04 ENCOUNTER — HOSPITAL ENCOUNTER (OUTPATIENT)
Dept: MRI IMAGING | Facility: HOSPITAL | Age: 54
Discharge: HOME | End: 2019-11-04
Payer: COMMERCIAL

## 2019-11-04 DIAGNOSIS — G89.29 CHRONIC PAIN OF LEFT KNEE: ICD-10-CM

## 2019-11-04 DIAGNOSIS — M25.562 CHRONIC PAIN OF LEFT KNEE: ICD-10-CM

## 2019-11-04 PROCEDURE — 73721 MRI JNT OF LWR EXTRE W/O DYE: CPT

## 2019-11-06 ENCOUNTER — TELEPHONE (OUTPATIENT)
Dept: FAMILY MEDICINE CLINIC | Facility: CLINIC | Age: 54
End: 2019-11-06

## 2019-11-06 DIAGNOSIS — G89.29 CHRONIC PAIN OF LEFT KNEE: Primary | ICD-10-CM

## 2019-11-06 DIAGNOSIS — M25.562 CHRONIC PAIN OF LEFT KNEE: Primary | ICD-10-CM

## 2019-11-06 NOTE — TELEPHONE ENCOUNTER
----- Message from Ryan Lopez MD sent at 11/4/2019  1:15 PM EST -----  To refer patient to orthopedic pain in left knee   MRI can not exclude tear in meniscus

## 2019-11-11 ENCOUNTER — APPOINTMENT (OUTPATIENT)
Dept: RADIOLOGY | Facility: MEDICAL CENTER | Age: 54
End: 2019-11-11
Payer: COMMERCIAL

## 2019-11-11 ENCOUNTER — CONSULT (OUTPATIENT)
Dept: OBGYN CLINIC | Facility: MEDICAL CENTER | Age: 54
End: 2019-11-11
Payer: COMMERCIAL

## 2019-11-11 VITALS
SYSTOLIC BLOOD PRESSURE: 132 MMHG | DIASTOLIC BLOOD PRESSURE: 82 MMHG | WEIGHT: 209.2 LBS | HEIGHT: 71 IN | BODY MASS INDEX: 29.29 KG/M2 | HEART RATE: 68 BPM

## 2019-11-11 DIAGNOSIS — M17.12 PRIMARY OSTEOARTHRITIS OF LEFT KNEE: Primary | ICD-10-CM

## 2019-11-11 DIAGNOSIS — M25.562 LEFT KNEE PAIN, UNSPECIFIED CHRONICITY: ICD-10-CM

## 2019-11-11 DIAGNOSIS — M25.562 CHRONIC PAIN OF LEFT KNEE: ICD-10-CM

## 2019-11-11 DIAGNOSIS — G89.29 CHRONIC PAIN OF LEFT KNEE: ICD-10-CM

## 2019-11-11 DIAGNOSIS — Z01.89 ENCOUNTER FOR LOWER EXTREMITY COMPARISON IMAGING STUDY: ICD-10-CM

## 2019-11-11 PROCEDURE — 73564 X-RAY EXAM KNEE 4 OR MORE: CPT

## 2019-11-11 PROCEDURE — 99203 OFFICE O/P NEW LOW 30 MIN: CPT | Performed by: ORTHOPAEDIC SURGERY

## 2019-11-11 PROCEDURE — 73560 X-RAY EXAM OF KNEE 1 OR 2: CPT

## 2019-11-11 RX ORDER — DICLOFENAC SODIUM 75 MG/1
75 TABLET, DELAYED RELEASE ORAL 2 TIMES DAILY
Qty: 60 TABLET | Refills: 1 | Status: SHIPPED | OUTPATIENT
Start: 2019-11-11 | End: 2021-02-24 | Stop reason: ALTCHOICE

## 2019-11-11 NOTE — PROGRESS NOTES
Assessment/Plan     1  Primary osteoarthritis of left knee    2  Chronic pain of left knee    3  Left knee pain, unspecified chronicity    4  Encounter for lower extremity comparison imaging study      Orders Placed This Encounter   Procedures    XR knee 4+ vw left injury    XR knee 1 or 2 vw right    Ambulatory referral to Physical Therapy     · Discussed with patient conservative treatments: CSI, physical therapy, bracing, maintaining a healthy weight ,medications, and visco supplementation injection  · Declined CSI   · Declined knee brace   · Physical therapy Left knee   Prescribed patient Diclofenac 75 mg BID,  medications warnings were reviewed with patient, if no relief, may try Advil or Aleve prn for pain   · May add in Tylenol 1000 mg every 8 hours prn , do not exceed 3000 mg a day   · Ice and elevation as needed   · Patient would like to proceed with conservative treatments  Return if symptoms worsen or fail to improve  I answered all of the patient's questions during the visit and provided education of the patient's condition during the visit  The patient verbalized understanding of the information given and agrees with the plan  This note was dictated using Mo-DV software  It may contain errors including improperly dictated words  Please contact physician directly for any questions  History of Present Illness   Chief complaint:   Chief Complaint   Patient presents with    Left Knee - Pain       HPI: Marty Izquierdo is a 53 y o  male that c/o left knee pain  Patient states about 2 and half years ago he was rolled skating and tweaked his left knee  Denies any falls or trauma  Patient states the pain is gradually been getting worse  He does have history of having  ACL reconstruction in 2001 by Dr Zee Espinosa at [facility] with relief  Patient was treating with his PCP had x-rays of the left knee MRI of the left knee and was prescribed an anti-inflammatory    Patient states he is having a constant stiffness soreness achiness and throbbing pain over generalized left knee  He does state instability at times  Denies any locking  He pain is worse with going down a hill, going down steps, transitional positions and prolonged standing and walking  Patient states he does some formal physical therapy recently at his work site with no relief  He is taking anti-inflammatory from his PCP( unknown knee) with some relief  Patient has not tried ice, elevation,  or bracing  Patient has no history having a steroid injection in the left knee  ROS:    See HPI for musculoskeletal review  All other systems reviewed are negative     Historical Information   Past Medical History:   Diagnosis Date    Allergic     Hypertension     Situational anxiety 9/27/2018     Past Surgical History:   Procedure Laterality Date    ANTERIOR CRUCIATE LIGAMENT REPAIR Left     SHOULDER SURGERY Right     R SHOULDER IMPINGEMENT SURGERY     Social History   Social History     Substance and Sexual Activity   Alcohol Use Yes    Frequency: Monthly or less    Drinks per session: 1 or 2    Binge frequency: Never    Comment: occasional     Social History     Substance and Sexual Activity   Drug Use No     Social History     Tobacco Use   Smoking Status Never Smoker   Smokeless Tobacco Never Used     Family History:   Family History   Problem Relation Age of Onset    Hypertension Mother     Hyperlipidemia Mother     Colon cancer Father        Current Outpatient Medications on File Prior to Visit   Medication Sig Dispense Refill    atorvastatin (LIPITOR) 20 mg tablet TAKE 1 TABLET DAILY 90 tablet 0    Multiple Vitamin (MULTI-VITAMIN DAILY PO) Take 1 capsule by mouth      Testosterone 12 5 MG/ACT (1%) GEL       [DISCONTINUED] diclofenac sodium (VOLTAREN) 1 % Apply 2 g topically 4 (four) times a day 100 g 1     No current facility-administered medications on file prior to visit        Allergies   Allergen Reactions    No Known Allergies        Current Outpatient Medications on File Prior to Visit   Medication Sig Dispense Refill    atorvastatin (LIPITOR) 20 mg tablet TAKE 1 TABLET DAILY 90 tablet 0    Multiple Vitamin (MULTI-VITAMIN DAILY PO) Take 1 capsule by mouth      Testosterone 12 5 MG/ACT (1%) GEL       [DISCONTINUED] diclofenac sodium (VOLTAREN) 1 % Apply 2 g topically 4 (four) times a day 100 g 1     No current facility-administered medications on file prior to visit  Objective   Vitals: Blood pressure 132/82, pulse 68, height 5' 11" (1 803 m), weight 94 9 kg (209 lb 3 2 oz)  ,Body mass index is 29 18 kg/m²  PE:  AAOx 3  WDWN  Hearing intact, no drainage from eyes  Regular rate  no audible wheezing  no abdominal distension  LE compartments soft, skin intact    leftknee:    Appearance:  no swelling   No ecchymosis  Valgus  joint deformity   No effusion  Palpation/Tenderness:  +TTP over medial joint line  No TTP over lateral joint line   No TTP over patella  No TTP over patellar tendon  No TTP over pes anserine bursa  Active Range of Motion:  AROM: 0-120/ Crepitus with ROM   Special Tests:  Medial Jono's Test:  Positive  Lateral Jono's Test:  Positive   Apley's compression test:  Positive   Lachman's Test:  negative  Anterior and Posterior  Drawer Test:  Negative  Patellar grind:  Negative  Valgus Stress Test:  negative  Varus Stress Test:  negative     No ipsilateral hip pain with ROM    leftLE:    Sensation grossly intact L4, L5, S1   Palpable pedal  pulse  AT/GS/EHL intact    Imaging Studies: I have personally reviewed pertinent films in PACS  leftknee:   Moderate to severe DJD       Scribe Attestation    I,:   Sarah Roberts am acting as a scribe while in the presence of the attending physician :        I,:   Nneka Baumann DO personally performed the services described in this documentation    as scribed in my presence :

## 2019-11-27 DIAGNOSIS — E78.2 MIXED HYPERLIPIDEMIA: ICD-10-CM

## 2019-11-27 RX ORDER — ATORVASTATIN CALCIUM 20 MG/1
TABLET, FILM COATED ORAL
Qty: 90 TABLET | Refills: 4 | Status: SHIPPED | OUTPATIENT
Start: 2019-11-27 | End: 2021-02-19

## 2020-03-27 ENCOUNTER — OFFICE VISIT (OUTPATIENT)
Dept: OBGYN CLINIC | Facility: MEDICAL CENTER | Age: 55
End: 2020-03-27
Payer: COMMERCIAL

## 2020-03-27 DIAGNOSIS — M25.562 LEFT KNEE PAIN, UNSPECIFIED CHRONICITY: ICD-10-CM

## 2020-03-27 DIAGNOSIS — M17.12 OSTEOARTHRITIS OF LEFT KNEE, UNSPECIFIED OSTEOARTHRITIS TYPE: Primary | ICD-10-CM

## 2020-03-27 PROCEDURE — 99213 OFFICE O/P EST LOW 20 MIN: CPT | Performed by: ORTHOPAEDIC SURGERY

## 2020-03-27 NOTE — PROGRESS NOTES
Virtual Regular Visit    Problem List Items Addressed This Visit     None      Visit Diagnoses     Osteoarthritis of left knee, unspecified osteoarthritis type    -  Primary    Left knee pain, unspecified chronicity            Mr Norma Hunter likely aggravated his left knee arthritis  Continue to monitor  Rest, ice, elevate  Continue diclofenac prn pain, add in Tylenol if needed  Will mail him home exercises  Will check an about 1-2 weeks  Patient is aware to call if anything changes or worsens  Reason for visit is left knee pain    Encounter provider Marisol Diamond DO    Provider located at [address] [place] [address]      Recent Visits  No visits were found meeting these conditions  Showing recent visits within past 7 days and meeting all other requirements     Today's Visits  Date Type Provider Dept   03/27/20 Office Visit Marisol Diamond  [address] today's visits and meeting all other requirements     Future Appointments  Date Type Provider Dept   03/27/20 Office Visit Marisol Diamond DO  [facility]   Showing future appointments within next 150 days and meeting all other requirements        After connecting through [employer], the patient was identified by name and date of birth  Endy Reyes was informed that this is a telemedicine visit and that the visit is being conducted through [employer] which may not be secure and therefore, might not be HIPAA-compliant  My office door was closed  No one else was in the room  He acknowledged consent and understanding of privacy and security of the video platform  The patient has agreed to participate and understands they can discontinue the visit at any time  Subjective  Endy Reyes is a 53 y o  male complains of left knee pain  Last Tuesday he was walking at work when he heard a pop in his knee    He experienced increased pain and swelling  At baseline he has moderate to severe arthritis  He has intermittent pain at baseline but this is worse  Overall the swelling has improved but he continues to have pain  He also has limited motion and is unable to go for a walk with his dog  Certain movements worsen his pain  His pain mostly depends on his activity level  He rates his pain as a 5/10  Most the pain is over the anterior and lateral aspect of his left knee  He was last seen in November for left knee arthritis with a history of ACL reconstruction  He declined steroid injection a brace at that time  He did not go to physical therapy after that visit  He has been taking diclofenac since this recent worsening of pain which has been helping  Past Medical History:   Diagnosis Date    Allergic     Hypertension     Situational anxiety 9/27/2018       Past Surgical History:   Procedure Laterality Date    ANTERIOR CRUCIATE LIGAMENT REPAIR Left     SHOULDER SURGERY Right     R SHOULDER IMPINGEMENT SURGERY       Current Outpatient Medications   Medication Sig Dispense Refill    atorvastatin (LIPITOR) 20 mg tablet TAKE 1 TABLET DAILY 90 tablet 4    diclofenac (VOLTAREN) 75 mg EC tablet Take 1 tablet (75 mg total) by mouth 2 (two) times a day PRN for pain 60 tablet 1    Multiple Vitamin (MULTI-VITAMIN DAILY PO) Take 1 capsule by mouth      Testosterone 12 5 MG/ACT (1%) GEL        No current facility-administered medications for this visit  Allergies   Allergen Reactions    No Known Allergies        Review of Systems    Physical Exam   AAO X 3  No audible wheezing  WDWN    L knee:  Mild swelling, no erythema, no ecchymosis, AROM: 5-90, valgus deformity  I spent 15 minutes with the patient during this visit

## 2020-03-30 ENCOUNTER — TELEPHONE (OUTPATIENT)
Dept: OTHER | Facility: OTHER | Age: 55
End: 2020-03-30

## 2020-04-10 ENCOUNTER — OFFICE VISIT (OUTPATIENT)
Dept: OBGYN CLINIC | Facility: MEDICAL CENTER | Age: 55
End: 2020-04-10
Payer: COMMERCIAL

## 2020-04-10 DIAGNOSIS — M17.12 OSTEOARTHRITIS OF LEFT KNEE, UNSPECIFIED OSTEOARTHRITIS TYPE: Primary | ICD-10-CM

## 2020-04-10 PROCEDURE — 99213 OFFICE O/P EST LOW 20 MIN: CPT | Performed by: ORTHOPAEDIC SURGERY

## 2020-04-13 ENCOUNTER — OFFICE VISIT (OUTPATIENT)
Dept: FAMILY MEDICINE CLINIC | Facility: CLINIC | Age: 55
End: 2020-04-13
Payer: COMMERCIAL

## 2020-04-13 VITALS
OXYGEN SATURATION: 94 % | SYSTOLIC BLOOD PRESSURE: 128 MMHG | WEIGHT: 211 LBS | DIASTOLIC BLOOD PRESSURE: 84 MMHG | HEART RATE: 64 BPM | BODY MASS INDEX: 29.54 KG/M2 | TEMPERATURE: 96.9 F | HEIGHT: 71 IN | RESPIRATION RATE: 16 BRPM

## 2020-04-13 DIAGNOSIS — G89.29 CHRONIC PAIN OF LEFT KNEE: ICD-10-CM

## 2020-04-13 DIAGNOSIS — I10 ESSENTIAL HYPERTENSION: ICD-10-CM

## 2020-04-13 DIAGNOSIS — E78.2 MIXED HYPERLIPIDEMIA: Primary | ICD-10-CM

## 2020-04-13 DIAGNOSIS — M25.562 CHRONIC PAIN OF LEFT KNEE: ICD-10-CM

## 2020-04-13 DIAGNOSIS — R10.13 DYSPEPSIA: ICD-10-CM

## 2020-04-13 PROCEDURE — 3008F BODY MASS INDEX DOCD: CPT | Performed by: FAMILY MEDICINE

## 2020-04-13 PROCEDURE — 3074F SYST BP LT 130 MM HG: CPT | Performed by: FAMILY MEDICINE

## 2020-04-13 PROCEDURE — 3079F DIAST BP 80-89 MM HG: CPT | Performed by: FAMILY MEDICINE

## 2020-04-13 PROCEDURE — 1036F TOBACCO NON-USER: CPT | Performed by: FAMILY MEDICINE

## 2020-04-13 PROCEDURE — 99214 OFFICE O/P EST MOD 30 MIN: CPT | Performed by: FAMILY MEDICINE

## 2020-04-17 ENCOUNTER — TELEPHONE (OUTPATIENT)
Dept: FAMILY MEDICINE CLINIC | Facility: CLINIC | Age: 55
End: 2020-04-17

## 2020-09-28 ENCOUNTER — OFFICE VISIT (OUTPATIENT)
Dept: FAMILY MEDICINE CLINIC | Facility: CLINIC | Age: 55
End: 2020-09-28
Payer: COMMERCIAL

## 2020-09-28 VITALS
TEMPERATURE: 96.3 F | OXYGEN SATURATION: 95 % | RESPIRATION RATE: 16 BRPM | HEIGHT: 71 IN | HEART RATE: 38 BPM | DIASTOLIC BLOOD PRESSURE: 68 MMHG | WEIGHT: 211 LBS | SYSTOLIC BLOOD PRESSURE: 122 MMHG | BODY MASS INDEX: 29.54 KG/M2

## 2020-09-28 DIAGNOSIS — Z00.01 ENCOUNTER FOR WELL ADULT EXAM WITH ABNORMAL FINDINGS: Primary | ICD-10-CM

## 2020-09-28 DIAGNOSIS — R94.31 ABNORMAL EKG: ICD-10-CM

## 2020-09-28 DIAGNOSIS — R00.9 ABNORMAL HEART RATE: ICD-10-CM

## 2020-09-28 PROCEDURE — 99396 PREV VISIT EST AGE 40-64: CPT | Performed by: FAMILY MEDICINE

## 2020-09-28 PROCEDURE — 93000 ELECTROCARDIOGRAM COMPLETE: CPT | Performed by: FAMILY MEDICINE

## 2020-09-28 PROCEDURE — 3725F SCREEN DEPRESSION PERFORMED: CPT | Performed by: FAMILY MEDICINE

## 2020-09-28 RX ORDER — TESTOSTERONE 12.5 MG/1.25G
GEL TOPICAL
COMMUNITY
Start: 2020-06-20

## 2020-09-28 NOTE — ASSESSMENT & PLAN NOTE
Chronic uncontrolled encouraged patient to watch for the portion low carb low-fat diet and increased physical activity

## 2020-09-28 NOTE — ASSESSMENT & PLAN NOTE
The patient did had abnormal heart rate EKG in the office abnormal sinus rhythm with premature ventricular complex patient asymptomatic plan to do Holter monitor for 48 hours and the echocardiogram to rule out already Esme versus valvular disease discussed with the patient and discussed InCase symptom to call the office

## 2020-09-28 NOTE — ASSESSMENT & PLAN NOTE
Advice and education were given regarding nutrition, aerobic exercises, weight bearing exercises, cardiovascular risk reduction, fall risk reduction, and age appropriate supplements  The patient was counseled regarding instructions for management, risk factor reductions, prognosis, risks and benefits of treatment options, patient and family education, and importance of compliance with treatment       A discussed flu shot with the patient he 1 hold in Dental next visit he is going away in trip concerned about any side effect

## 2020-09-28 NOTE — PROGRESS NOTES
BMI Counseling: Body mass index is 29 43 kg/m²  The BMI is above normal  Nutrition recommendations include decreasing portion sizes, consuming healthier snacks and limiting drinks that contain sugar  Exercise recommendations include exercising 3-5 times per week  FAMILY PRACTICE HEALTH MAINTENANCE OFFICE VISIT  [facility]    NAME: Ivana Porras  AGE: 63 y o  SEX: male  : 1965     DATE: 2020    Assessment and Plan     1  Encounter for well adult exam with abnormal findings  Assessment & Plan:  Advice and education were given regarding nutrition, aerobic exercises, weight bearing exercises, cardiovascular risk reduction, fall risk reduction, and age appropriate supplements  The patient was counseled regarding instructions for management, risk factor reductions, prognosis, risks and benefits of treatment options, patient and family education, and importance of compliance with treatment  A discussed flu shot with the patient he 1 hold in Dental next visit he is going away in trip concerned about any side effect      2  Abnormal EKG  Assessment & Plan:  The patient did had abnormal heart rate EKG in the office abnormal sinus rhythm with premature ventricular complex patient asymptomatic plan to do Holter monitor for 48 hours and the echocardiogram to rule out already Esme versus valvular disease discussed with the patient and discussed InCase symptom to call the office    Orders:  -     Echo complete with contrast if indicated; Future; Expected date: 2020  -     Holter monitor - 48 hour; Future; Expected date: 2020    3  Abnormal heart rate  -     POCT ECG    4   BMI 29 0-29 9,adult  Assessment & Plan:  Chronic uncontrolled encouraged patient to watch for the portion low carb low-fat diet and increased physical activity        · Patient Counseling:   · Nutrition: Stressed importance of a well balanced diet, moderation of sodium/saturated fat, caloric balance and sufficient intake of fiber  · Exercise: Stressed the importance of regular exercise with a goal of 150 minutes per week  · Dental Health: Discussed daily flossing and brushing and regular dental visits     · Immunizations reviewed: Declined recommended vaccinations  · Discussed benefits of:  Colon Cancer Screening and Prostate Cancer Screening    BMI Counseling: Body mass index is 29 43 kg/m²  Discussed with patient's BMI with him        Chief Complaint     Chief Complaint   Patient presents with    Physical Exam     patient is here today for his yearly physical exam       History of Present Illness     The patient here for annual physical exam and he deny chest pain short of breath no palpitation no cough no wheezing no hematosis deny any headache blurred vision no dizziness no numbness no weakness no lateralized of the symptom deny any abdomen pain nausea vomiting or diarrhea no abdomen distension and no weight loss the patient is not smoker he does not do any special diet he does do weightlifting 3 to 4 times a week and he play golf  By review the vital sign 0 heart rate 38 and the eat check it heart rate is 55 the patient asymptomatic no family history of the heart problem      Well Adult Physical   Patient here for a comprehensive physical exam       Diet and Physical Activity  Diet: Regular diet  Exercise: frequently      Depression Screen  PHQ-9 Depression Screening    PHQ-9:    Frequency of the following problems over the past two weeks:       Little interest or pleasure in doing things:  0 - not at all  Feeling down, depressed, or hopeless:  0 - not at all  PHQ-2 Score:  0          General Health  Hearing: Normal:  bilateral  Vision: wears glasses  Dental: regular dental visits          The following portions of the patient's history were reviewed and updated as appropriate: allergies, current medications, past family history, past medical history, past social history, past surgical history and problem list     Review of Systems     Review of Systems   Constitutional: Negative for activity change, appetite change, fatigue and fever  HENT: Negative for congestion, ear pain, sinus pressure, sinus pain and sore throat  Eyes: Negative for pain, discharge, redness and itching  Respiratory: Negative for cough, chest tightness, shortness of breath and stridor  Cardiovascular: Negative for chest pain, palpitations and leg swelling  Gastrointestinal: Negative for abdominal pain, blood in stool, constipation, diarrhea and nausea  Endocrine: Negative for heat intolerance and polydipsia  Genitourinary: Negative for dysuria, flank pain, frequency and hematuria  Musculoskeletal: Negative for back pain, joint swelling and neck pain  Skin: Negative for pallor and rash  Neurological: Negative for dizziness, tremors, weakness, numbness and headaches  Hematological: Does not bruise/bleed easily  Psychiatric/Behavioral: Negative for agitation and behavioral problems         Past Medical History     Past Medical History:   Diagnosis Date    Allergic     Hypertension     Osteoarthritis of left knee 4/10/2020    Situational anxiety 9/27/2018       Past Surgical History     Past Surgical History:   Procedure Laterality Date    ANTERIOR CRUCIATE LIGAMENT REPAIR Left     SHOULDER SURGERY Right     R SHOULDER IMPINGEMENT SURGERY       Social History     Social History     Socioeconomic History    Marital status: Single     Spouse name: None    Number of children: None    Years of education: None    Highest education level: None   Occupational History    None   Social Needs    Financial resource strain: Not hard at all   Shamar-Laura insecurity     Worry: Never true     Inability: Never true    Transportation needs     Medical: No     Non-medical: No   Tobacco Use    Smoking status: Never Smoker    Smokeless tobacco: Never Used   Substance and Sexual Activity    Alcohol use: Yes     Frequency: Monthly or less     Drinks per session: 1 or 2     Binge frequency: Never     Comment: occasional    Drug use: No    Sexual activity: Yes     Partners: Female   Lifestyle    Physical activity     Days per week: 4 days     Minutes per session: 60 min    Stress: Not at all   Relationships    Social connections     Talks on phone: More than three times a week     Gets together: More than three times a week     Attends Taoism service: More than 4 times per year     Active member of club or organization: No     Attends meetings of clubs or organizations: Never     Relationship status:     Intimate partner violence     Fear of current or ex partner: No     Emotionally abused: No     Physically abused: No     Forced sexual activity: No   Other Topics Concern    None   Social History Narrative    MARITAL STATUS:  AS PER NEXTGEN    HAS CHILDREN    HAND DOMINANCE: RIGHT       Family History     Family History   Problem Relation Age of Onset    Hypertension Mother     Hyperlipidemia Mother     Colon cancer Father        Current Medications       Current Outpatient Medications:     Testosterone 12 5 MG/ACT (1%) GEL, 2 pumps daily, Disp: , Rfl:     atorvastatin (LIPITOR) 20 mg tablet, TAKE 1 TABLET DAILY, Disp: 90 tablet, Rfl: 4    diclofenac (VOLTAREN) 75 mg EC tablet, Take 1 tablet (75 mg total) by mouth 2 (two) times a day PRN for pain, Disp: 60 tablet, Rfl: 1    diclofenac sodium (VOLTAREN) 1 %, Apply 4 g topically 4 (four) times a day, Disp: 100 g, Rfl: 1    Multiple Vitamin (MULTI-VITAMIN DAILY PO), Take 1 capsule by mouth, Disp: , Rfl:      Allergies     Allergies   Allergen Reactions    No Known Allergies        Objective     /68 (BP Location: Left arm, Patient Position: Sitting, Cuff Size: Large)   Pulse (!) 38   Temp (!) 96 3 °F (35 7 °C) (Tympanic)   Resp 16   Ht 5' 11" (1 803 m)   Wt 95 7 kg (211 lb)   SpO2 95%   BMI 29 43 kg/m² Physical Exam  Vitals signs and nursing note reviewed  Constitutional:       General: He is not in acute distress  Appearance: Normal appearance  He is well-developed  He is not diaphoretic  HENT:      Head: Normocephalic  Right Ear: Tympanic membrane, ear canal and external ear normal       Left Ear: Tympanic membrane, ear canal and external ear normal       Nose: Nose normal  No congestion or rhinorrhea  Mouth/Throat:      Mouth: Mucous membranes are moist       Pharynx: Oropharynx is clear  No oropharyngeal exudate or posterior oropharyngeal erythema  Eyes:      General:         Right eye: No discharge  Left eye: No discharge  Conjunctiva/sclera: Conjunctivae normal    Neck:      Musculoskeletal: Normal range of motion and neck supple  Vascular: No JVD  Cardiovascular:      Rate and Rhythm: Normal rate and regular rhythm  Heart sounds: Normal heart sounds  No murmur  No gallop  Pulmonary:      Effort: Pulmonary effort is normal  No respiratory distress  Breath sounds: Normal breath sounds  No stridor  No wheezing or rales  Chest:      Chest wall: No tenderness  Abdominal:      General: There is no distension  Palpations: Abdomen is soft  There is no mass  Tenderness: There is no abdominal tenderness  There is no rebound  Musculoskeletal: Normal range of motion  General: No tenderness  Lymphadenopathy:      Cervical: No cervical adenopathy  Skin:     General: Skin is warm  Findings: No erythema or rash  Neurological:      Mental Status: He is alert and oriented to person, place, and time  Sensory: No sensory deficit  Motor: No weakness        Gait: Gait normal    Psychiatric:         Mood and Affect: Mood normal          Behavior: Behavior normal              Tamiko Villegas MD  [address] [place] [address]

## 2020-09-29 ENCOUNTER — HOSPITAL ENCOUNTER (OUTPATIENT)
Dept: NON INVASIVE DIAGNOSTICS | Facility: HOSPITAL | Age: 55
Discharge: HOME | End: 2020-09-29
Payer: COMMERCIAL

## 2020-09-29 DIAGNOSIS — R94.31 ABNORMAL EKG: ICD-10-CM

## 2020-09-29 PROCEDURE — 93227 XTRNL ECG REC<48 HR R&I: CPT | Performed by: INTERNAL MEDICINE

## 2020-09-29 PROCEDURE — 93225 XTRNL ECG REC<48 HRS REC: CPT

## 2020-09-29 PROCEDURE — 93226 XTRNL ECG REC<48 HR SCAN A/R: CPT

## 2020-09-29 PROCEDURE — 93306 TTE W/DOPPLER COMPLETE: CPT | Performed by: INTERNAL MEDICINE

## 2020-09-29 PROCEDURE — 93306 TTE W/DOPPLER COMPLETE: CPT

## 2020-10-05 ENCOUNTER — TELEPHONE (OUTPATIENT)
Dept: FAMILY MEDICINE CLINIC | Facility: CLINIC | Age: 55
End: 2020-10-05

## 2020-10-05 DIAGNOSIS — R94.31 HOLTER MONITOR, ABNORMAL: Primary | ICD-10-CM

## 2020-10-20 ENCOUNTER — OFFICE VISIT (OUTPATIENT)
Dept: FAMILY MEDICINE CLINIC | Facility: CLINIC | Age: 55
End: 2020-10-20
Payer: COMMERCIAL

## 2020-10-20 VITALS
TEMPERATURE: 96.4 F | OXYGEN SATURATION: 97 % | DIASTOLIC BLOOD PRESSURE: 84 MMHG | SYSTOLIC BLOOD PRESSURE: 116 MMHG | BODY MASS INDEX: 29.26 KG/M2 | WEIGHT: 209 LBS | HEIGHT: 71 IN | HEART RATE: 72 BPM | RESPIRATION RATE: 16 BRPM

## 2020-10-20 DIAGNOSIS — R94.31 HOLTER MONITOR, ABNORMAL: Primary | ICD-10-CM

## 2020-10-20 DIAGNOSIS — R10.13 DYSPEPSIA: ICD-10-CM

## 2020-10-20 DIAGNOSIS — Z12.5 SCREENING FOR PROSTATE CANCER: ICD-10-CM

## 2020-10-20 DIAGNOSIS — I10 ESSENTIAL HYPERTENSION: ICD-10-CM

## 2020-10-20 DIAGNOSIS — E87.5 HYPERKALEMIA: ICD-10-CM

## 2020-10-20 DIAGNOSIS — Z23 NEED FOR INFLUENZA VACCINATION: ICD-10-CM

## 2020-10-20 DIAGNOSIS — E78.2 MIXED HYPERLIPIDEMIA: ICD-10-CM

## 2020-10-20 PROCEDURE — 3079F DIAST BP 80-89 MM HG: CPT | Performed by: FAMILY MEDICINE

## 2020-10-20 PROCEDURE — 1036F TOBACCO NON-USER: CPT | Performed by: FAMILY MEDICINE

## 2020-10-20 PROCEDURE — 90686 IIV4 VACC NO PRSV 0.5 ML IM: CPT

## 2020-10-20 PROCEDURE — 99214 OFFICE O/P EST MOD 30 MIN: CPT | Performed by: FAMILY MEDICINE

## 2020-10-20 PROCEDURE — 90471 IMMUNIZATION ADMIN: CPT

## 2020-10-20 PROCEDURE — 3074F SYST BP LT 130 MM HG: CPT | Performed by: FAMILY MEDICINE

## 2020-10-21 PROBLEM — R94.31 HOLTER MONITOR, ABNORMAL: Status: ACTIVE | Noted: 2020-10-21

## 2020-10-21 PROBLEM — R94.31 HOLTER MONITOR, ABNORMAL: Status: ACTIVE | Noted: 2020-10-20

## 2020-11-05 ENCOUNTER — CONSULT (OUTPATIENT)
Dept: CARDIOLOGY CLINIC | Facility: CLINIC | Age: 55
End: 2020-11-05
Payer: COMMERCIAL

## 2020-11-05 VITALS
WEIGHT: 211 LBS | SYSTOLIC BLOOD PRESSURE: 124 MMHG | BODY MASS INDEX: 29.54 KG/M2 | DIASTOLIC BLOOD PRESSURE: 80 MMHG | HEART RATE: 65 BPM | HEIGHT: 71 IN

## 2020-11-05 DIAGNOSIS — R94.31 ABNORMAL EKG: ICD-10-CM

## 2020-11-05 DIAGNOSIS — R94.31 HOLTER MONITOR, ABNORMAL: Primary | ICD-10-CM

## 2020-11-05 DIAGNOSIS — I10 ESSENTIAL HYPERTENSION: ICD-10-CM

## 2020-11-05 DIAGNOSIS — I49.3 PVC (PREMATURE VENTRICULAR CONTRACTION): ICD-10-CM

## 2020-11-05 PROCEDURE — 3008F BODY MASS INDEX DOCD: CPT | Performed by: INTERNAL MEDICINE

## 2020-11-05 PROCEDURE — 93000 ELECTROCARDIOGRAM COMPLETE: CPT | Performed by: INTERNAL MEDICINE

## 2020-11-05 PROCEDURE — 3074F SYST BP LT 130 MM HG: CPT | Performed by: INTERNAL MEDICINE

## 2020-11-05 PROCEDURE — 1036F TOBACCO NON-USER: CPT | Performed by: INTERNAL MEDICINE

## 2020-11-05 PROCEDURE — 3079F DIAST BP 80-89 MM HG: CPT | Performed by: INTERNAL MEDICINE

## 2020-11-05 PROCEDURE — 99244 OFF/OP CNSLTJ NEW/EST MOD 40: CPT | Performed by: INTERNAL MEDICINE

## 2020-11-12 ENCOUNTER — HOSPITAL ENCOUNTER (OUTPATIENT)
Dept: NON INVASIVE DIAGNOSTICS | Facility: CLINIC | Age: 55
Discharge: HOME | End: 2020-11-12
Payer: COMMERCIAL

## 2020-11-12 DIAGNOSIS — I49.3 PVC (PREMATURE VENTRICULAR CONTRACTION): ICD-10-CM

## 2020-11-12 DIAGNOSIS — I10 ESSENTIAL HYPERTENSION: ICD-10-CM

## 2020-11-12 LAB
CHEST PAIN STATEMENT: NORMAL
MAX DIASTOLIC BP: 70 MMHG
MAX HEART RATE: 160 BPM
MAX PREDICTED HEART RATE: 164 BPM
MAX. SYSTOLIC BP: 200 MMHG
PROTOCOL NAME: NORMAL
REASON FOR TERMINATION: NORMAL
TARGET HR FORMULA: NORMAL
TEST INDICATION: NORMAL
TIME IN EXERCISE PHASE: NORMAL

## 2020-11-12 PROCEDURE — 93017 CV STRESS TEST TRACING ONLY: CPT

## 2020-11-12 PROCEDURE — 93018 CV STRESS TEST I&R ONLY: CPT | Performed by: INTERNAL MEDICINE

## 2020-11-12 PROCEDURE — 93016 CV STRESS TEST SUPVJ ONLY: CPT | Performed by: INTERNAL MEDICINE

## 2020-11-16 ENCOUNTER — TELEPHONE (OUTPATIENT)
Dept: CARDIOLOGY CLINIC | Facility: CLINIC | Age: 55
End: 2020-11-16

## 2021-02-19 DIAGNOSIS — E78.2 MIXED HYPERLIPIDEMIA: ICD-10-CM

## 2021-02-19 RX ORDER — ATORVASTATIN CALCIUM 20 MG/1
TABLET, FILM COATED ORAL
Qty: 90 TABLET | Refills: 3 | Status: SHIPPED | OUTPATIENT
Start: 2021-02-19 | End: 2022-02-14

## 2021-02-24 ENCOUNTER — OFFICE VISIT (OUTPATIENT)
Dept: FAMILY MEDICINE CLINIC | Facility: CLINIC | Age: 56
End: 2021-02-24
Payer: COMMERCIAL

## 2021-02-24 VITALS
SYSTOLIC BLOOD PRESSURE: 124 MMHG | TEMPERATURE: 96.9 F | OXYGEN SATURATION: 97 % | RESPIRATION RATE: 16 BRPM | HEART RATE: 67 BPM | BODY MASS INDEX: 29.26 KG/M2 | WEIGHT: 209 LBS | HEIGHT: 71 IN | DIASTOLIC BLOOD PRESSURE: 82 MMHG

## 2021-02-24 DIAGNOSIS — D72.828 OTHER ELEVATED WHITE BLOOD CELL (WBC) COUNT: ICD-10-CM

## 2021-02-24 DIAGNOSIS — R73.01 IFG (IMPAIRED FASTING GLUCOSE): ICD-10-CM

## 2021-02-24 DIAGNOSIS — I10 ESSENTIAL HYPERTENSION: ICD-10-CM

## 2021-02-24 DIAGNOSIS — R07.81 RIB PAIN ON LEFT SIDE: Primary | ICD-10-CM

## 2021-02-24 DIAGNOSIS — E78.2 MIXED HYPERLIPIDEMIA: ICD-10-CM

## 2021-02-24 PROBLEM — D72.829 LEUCOCYTOSIS: Status: ACTIVE | Noted: 2021-02-24

## 2021-02-24 PROCEDURE — 3079F DIAST BP 80-89 MM HG: CPT | Performed by: FAMILY MEDICINE

## 2021-02-24 PROCEDURE — 3008F BODY MASS INDEX DOCD: CPT | Performed by: FAMILY MEDICINE

## 2021-02-24 PROCEDURE — 1036F TOBACCO NON-USER: CPT | Performed by: FAMILY MEDICINE

## 2021-02-24 PROCEDURE — 3725F SCREEN DEPRESSION PERFORMED: CPT | Performed by: FAMILY MEDICINE

## 2021-02-24 PROCEDURE — 3074F SYST BP LT 130 MM HG: CPT | Performed by: FAMILY MEDICINE

## 2021-02-24 PROCEDURE — 99215 OFFICE O/P EST HI 40 MIN: CPT | Performed by: FAMILY MEDICINE

## 2021-02-24 NOTE — ASSESSMENT & PLAN NOTE
New diagnosis symptomatic status post fall recommend Xray of left rib r/o FX  Recommend a Tylenol for the pain

## 2021-02-24 NOTE — PROGRESS NOTES
BMI Counseling: Body mass index is 29 15 kg/m²  The BMI is above normal  Nutrition recommendations include decreasing portion sizes, decreasing fast food intake and limiting drinks that contain sugar  Exercise recommendations include exercising 3-5 times per week  No pharmacotherapy was ordered  Patient referred to PCP due to patient being overweight  Subjective:   Chief Complaint   Patient presents with    Follow-up     chronic conditions    left rib pain        Patient ID: Ena Luo is a 63 y o  male  Patient here follow-up with a chronic condition and he had a concerned about pain on his left rib the couple days ago patient did a trip on his daughter feet and fell down onto his left side  He developed the some ecchymoses the left side but he deny short breath and no chest pain no difficulty breathing the pain is worse when he twists to change position no cough no wheezing patient was history of hyperlipidemia on atorvastatin tolerated well deny any chest pain short of breath no palpitation and no TIA symptom   recent blood work review with the patient      The following portions of the patient's history were reviewed and updated as appropriate: allergies, current medications, past family history, past medical history, past social history, past surgical history and problem list     Review of Systems   Constitutional: Negative for activity change, appetite change, fatigue and fever  HENT: Negative for congestion, ear pain, sinus pressure, sinus pain and sore throat  Eyes: Negative for pain, discharge, redness and itching  Respiratory: Negative for cough, chest tightness, shortness of breath and stridor  Cardiovascular: Negative for chest pain, palpitations and leg swelling  Gastrointestinal: Negative for abdominal pain, blood in stool, constipation, diarrhea and nausea  Genitourinary: Negative for dysuria, flank pain, frequency and hematuria     Musculoskeletal: Negative for back pain, joint swelling and neck pain  Left rib pain   Skin: Negative for pallor and rash  Neurological: Negative for dizziness, tremors, weakness, numbness and headaches  Hematological: Does not bruise/bleed easily  Objective:  Vitals:    02/24/21 0734   BP: 124/82   BP Location: Left arm   Patient Position: Sitting   Cuff Size: Large   Pulse: 67   Resp: 16   Temp: (!) 96 9 °F (36 1 °C)   TempSrc: Tympanic   SpO2: 97%   Weight: 94 8 kg (209 lb)   Height: 5' 11" (1 803 m)      Physical Exam  Vitals signs and nursing note reviewed  Constitutional:       General: He is not in acute distress  Appearance: Normal appearance  He is well-developed  He is not diaphoretic  HENT:      Head: Normocephalic  Right Ear: Tympanic membrane, ear canal and external ear normal       Left Ear: Tympanic membrane, ear canal and external ear normal       Nose: Nose normal  No congestion or rhinorrhea  Mouth/Throat:      Mouth: Mucous membranes are moist       Pharynx: Oropharynx is clear  No oropharyngeal exudate or posterior oropharyngeal erythema  Eyes:      General:         Right eye: No discharge  Left eye: No discharge  Conjunctiva/sclera: Conjunctivae normal    Neck:      Musculoskeletal: Normal range of motion and neck supple  Vascular: No JVD  Cardiovascular:      Rate and Rhythm: Normal rate and regular rhythm  Heart sounds: Normal heart sounds  No murmur  No gallop  Pulmonary:      Effort: Pulmonary effort is normal  No respiratory distress  Breath sounds: Normal breath sounds  No stridor  No wheezing or rales  Chest:      Chest wall: No tenderness  Abdominal:      General: There is no distension  Palpations: Abdomen is soft  There is no mass  Tenderness: There is no abdominal tenderness  There is no rebound  Musculoskeletal: Normal range of motion  General: No tenderness          Arms:    Lymphadenopathy:      Cervical: No cervical adenopathy  Skin:     General: Skin is warm  Findings: No erythema or rash  Neurological:      Mental Status: He is alert and oriented to person, place, and time  Sensory: No sensory deficit  Gait: Gait normal    Psychiatric:         Mood and Affect: Mood normal          Behavior: Behavior normal            Assessment/Plan:    Rib pain on left side  New diagnosis symptomatic status post fall recommend Xray of left rib r/o FX  Recommend a Tylenol for the pain     BMI 29 0-29 9,adult   Chronic patient did lose 2 lb from last visit encouraged patient to continue the portion control low carb low-fat diet and increased physical activity    IFG (impaired fasting glucose)  New diagnosis no FHX of DM  Discussed with the patient low carb diet and important lose weight    Essential hypertension   Chronic asymptomatic fair control continue low-salt diet    Hyperlipidemia   Chronic asymptomatic fair control continue with atorvastatin 20 mg once a day low-fat diet review with the patient    Leucocytosis   New finding on recent blood work patient asymptomatic no fever no change in the weight at the time when he had the blood work  Patient did not feel good plan to repeat CBC with diff       Diagnoses and all orders for this visit:    Rib pain on left side  -     XR ribs 2 vw left; Future    IFG (impaired fasting glucose)  -     CBC and differential; Future  -     CBC and differential; Future  -     Comprehensive metabolic panel; Future  -     Lipid Panel with Direct LDL reflex; Future  -     TSH, 3rd generation with Free T4 reflex; Future    Other elevated white blood cell (WBC) count  -     CBC and differential; Future  -     CBC and differential; Future  -     Comprehensive metabolic panel; Future  -     Lipid Panel with Direct LDL reflex; Future  -     TSH, 3rd generation with Free T4 reflex;  Future    BMI 29 0-29 9,adult    Mixed hyperlipidemia  -     CBC and differential; Future  -     CBC and differential; Future  -     Comprehensive metabolic panel; Future  -     Lipid Panel with Direct LDL reflex; Future  -     TSH, 3rd generation with Free T4 reflex; Future    Essential hypertension  -     CBC and differential; Future  -     CBC and differential; Future  -     Comprehensive metabolic panel; Future  -     Lipid Panel with Direct LDL reflex; Future  -     TSH, 3rd generation with Free T4 reflex;  Future

## 2021-02-24 NOTE — ASSESSMENT & PLAN NOTE
Chronic patient did lose 2 lb from last visit encouraged patient to continue the portion control low carb low-fat diet and increased physical activity

## 2021-02-24 NOTE — ASSESSMENT & PLAN NOTE
New finding on recent blood work patient asymptomatic no fever no change in the weight at the time when he had the blood work  Patient did not feel good plan to repeat CBC with diff

## 2021-02-24 NOTE — ASSESSMENT & PLAN NOTE
Chronic asymptomatic fair control continue with atorvastatin 20 mg once a day low-fat diet review with the patient

## 2021-03-01 ENCOUNTER — TELEPHONE (OUTPATIENT)
Dept: FAMILY MEDICINE CLINIC | Facility: CLINIC | Age: 56
End: 2021-03-01

## 2021-03-01 NOTE — TELEPHONE ENCOUNTER
Patient notified  Also wants to know if okay for him to go to the gym and lift weights? If not how long should he wait to do so?

## 2021-04-19 ENCOUNTER — TELEPHONE (OUTPATIENT)
Dept: OTHER | Facility: OTHER | Age: 56
End: 2021-04-19

## 2021-04-19 ENCOUNTER — APPOINTMENT (OUTPATIENT)
Dept: RADIOLOGY | Facility: MEDICAL CENTER | Age: 56
End: 2021-04-19
Payer: COMMERCIAL

## 2021-04-19 ENCOUNTER — OFFICE VISIT (OUTPATIENT)
Dept: OBGYN CLINIC | Facility: MEDICAL CENTER | Age: 56
End: 2021-04-19
Payer: COMMERCIAL

## 2021-04-19 VITALS
HEIGHT: 71 IN | DIASTOLIC BLOOD PRESSURE: 84 MMHG | WEIGHT: 213.2 LBS | TEMPERATURE: 98.1 F | BODY MASS INDEX: 29.85 KG/M2 | SYSTOLIC BLOOD PRESSURE: 137 MMHG | HEART RATE: 79 BPM

## 2021-04-19 DIAGNOSIS — M17.12 PRIMARY OSTEOARTHRITIS OF LEFT KNEE: ICD-10-CM

## 2021-04-19 DIAGNOSIS — Z01.89 ENCOUNTER FOR LOWER EXTREMITY COMPARISON IMAGING STUDY: ICD-10-CM

## 2021-04-19 DIAGNOSIS — M17.12 PRIMARY OSTEOARTHRITIS OF LEFT KNEE: Primary | ICD-10-CM

## 2021-04-19 PROCEDURE — 73564 X-RAY EXAM KNEE 4 OR MORE: CPT

## 2021-04-19 PROCEDURE — 3079F DIAST BP 80-89 MM HG: CPT | Performed by: ORTHOPAEDIC SURGERY

## 2021-04-19 PROCEDURE — 3075F SYST BP GE 130 - 139MM HG: CPT | Performed by: ORTHOPAEDIC SURGERY

## 2021-04-19 PROCEDURE — 73560 X-RAY EXAM OF KNEE 1 OR 2: CPT

## 2021-04-19 PROCEDURE — 99213 OFFICE O/P EST LOW 20 MIN: CPT | Performed by: ORTHOPAEDIC SURGERY

## 2021-04-19 PROCEDURE — 3008F BODY MASS INDEX DOCD: CPT | Performed by: ORTHOPAEDIC SURGERY

## 2021-04-19 PROCEDURE — 1036F TOBACCO NON-USER: CPT | Performed by: ORTHOPAEDIC SURGERY

## 2021-04-19 RX ORDER — DICLOFENAC SODIUM 75 MG/1
75 TABLET, DELAYED RELEASE ORAL 2 TIMES DAILY
Qty: 60 TABLET | Refills: 1 | Status: SHIPPED | OUTPATIENT
Start: 2021-04-19 | End: 2022-01-12 | Stop reason: ALTCHOICE

## 2021-04-19 NOTE — PROGRESS NOTES
Assessment/Plan     1  Primary osteoarthritis of left knee    2  Encounter for lower extremity comparison imaging study      Orders Placed This Encounter   Procedures    XR knee 4+ vw left injury    XR knee 1 or 2 vw right     · Patient has severe left knee osteoarthritis  · Discussed with patient conservative treatments: steroid injections, physical therapy, medications, bracing, visco supplementation injections, modifying activities( avoid high impact exercises)   · Patient declined left knee aspirations and steroid injection in the office today   · Provided patient with left knee short hinged knee brace vs lateral  brace   Renewed Diclofenac 75 prn pain, medications warnings were reviewed with patient  · Ice and elevate as needed   · Encoouraged patient to work on his motion   Return if symptoms worsen or fail to improve  I answered all of the patient's questions during the visit and provided education of the patient's condition during the visit  The patient verbalized understanding of the information given and agrees with the plan  This note was dictated using [employer] software  It may contain errors including improperly dictated words  Please contact physician directly for any questions  History of Present Illness   Chief complaint:   Chief Complaint   Patient presents with    Left Knee - Follow-up       HPI: Aman Clemente is a 63 y o  male that presents today follow up for left knee pain due to severe osteoarthritis  He states yesterday he was mowing the lawn and his left popped 3 times with increase pain and swelling  He is having generalized left knee pain  He does state knee feels stable when walking  He has been elevating but not tried icing  He has been taking Diclofenac 75 mg as needed for pain  His pain level today is a 8/10  ROS:    See HPI for musculoskeletal review     All other systems reviewed are negative     Historical Information   Past Medical History:   Diagnosis Date    Allergic     Hypertension     Osteoarthritis of left knee 4/10/2020    Situational anxiety 9/27/2018     Past Surgical History:   Procedure Laterality Date    ANTERIOR CRUCIATE LIGAMENT REPAIR Left     SHOULDER SURGERY Right     R SHOULDER IMPINGEMENT SURGERY     Social History   Social History     Substance and Sexual Activity   Alcohol Use Yes    Frequency: Monthly or less    Drinks per session: 1 or 2    Binge frequency: Never    Comment: occasional     Social History     Substance and Sexual Activity   Drug Use No     Social History     Tobacco Use   Smoking Status Never Smoker   Smokeless Tobacco Never Used     Family History:   Family History   Problem Relation Age of Onset    Hypertension Mother     Hyperlipidemia Mother     Colon cancer Father        Current Outpatient Medications on File Prior to Visit   Medication Sig Dispense Refill    atorvastatin (LIPITOR) 20 mg tablet TAKE 1 TABLET DAILY 90 tablet 3    Multiple Vitamin (MULTI-VITAMIN DAILY PO) Take 1 capsule by mouth      Testosterone 12 5 MG/ACT (1%) GEL 2 pumps daily       No current facility-administered medications on file prior to visit  Allergies   Allergen Reactions    No Known Allergies        Current Outpatient Medications on File Prior to Visit   Medication Sig Dispense Refill    atorvastatin (LIPITOR) 20 mg tablet TAKE 1 TABLET DAILY 90 tablet 3    Multiple Vitamin (MULTI-VITAMIN DAILY PO) Take 1 capsule by mouth      Testosterone 12 5 MG/ACT (1%) GEL 2 pumps daily       No current facility-administered medications on file prior to visit  Objective   Vitals: Blood pressure 137/84, pulse 79, temperature 98 1 °F (36 7 °C), height 5' 11" (1 803 m), weight 96 7 kg (213 lb 3 2 oz)  ,Body mass index is 29 74 kg/m²      PE:  AAOx 3  WDWN  Hearing intact, no drainage from eyes  Regular rate  no audible wheezing  no abdominal distension  LE compartments soft, skin intact    leftknee:    Appearance:  no swelling No ecchymosis  Valgus  joint deformity   Moderate  effusion  Palpation/Tenderness:  +TTP over medial joint line  +TTP over medial femoral condyle  No TTP over lateral joint line   No TTP over patella  No TTP over patellar tendon  No TTP over pes anserine bursa  Active Range of Motion:  AROM:   PROM: 5-110   Crepitus with ROM   Special Tests:  Medial Jono's Test:  Negative   Lateral Jono's Test:  Positive   Apley's compression test:  Positive   Lachman's Test:  negative  Anterior and Posterior  Drawer Test:  Negative  Patellar grind:  Posterior   Valgus Stress Test:  negative  Varus Stress Test:  negative   No ipsilateral hip pain with ROM      Imaging Studies: I have personally reviewed pertinent films in PACS  leftknee:  Severe DJD, hardware intact from  ACL reconstruction    Scribe Attestation    I,:  Abelardo Childress am acting as a scribe while in the presence of the attending physician :       I,:  Tanya Caruso DO personally performed the services described in this documentation    as scribed in my presence :

## 2021-04-19 NOTE — TELEPHONE ENCOUNTER
Patient called regarding if he should be seen by Dr Fletcher Has again or something else  He was mowing his lawn this week he heard 3 pops then his knee swelled up

## 2021-05-05 ENCOUNTER — TELEPHONE (OUTPATIENT)
Dept: FAMILY MEDICINE CLINIC | Facility: CLINIC | Age: 56
End: 2021-05-05

## 2021-05-13 ENCOUNTER — OFFICE VISIT (OUTPATIENT)
Dept: OBGYN CLINIC | Facility: MEDICAL CENTER | Age: 56
End: 2021-05-13
Payer: COMMERCIAL

## 2021-05-13 VITALS
HEART RATE: 65 BPM | BODY MASS INDEX: 29.82 KG/M2 | TEMPERATURE: 97.5 F | SYSTOLIC BLOOD PRESSURE: 110 MMHG | WEIGHT: 213 LBS | HEIGHT: 71 IN | DIASTOLIC BLOOD PRESSURE: 75 MMHG

## 2021-05-13 DIAGNOSIS — M17.12 PRIMARY OSTEOARTHRITIS OF LEFT KNEE: Primary | ICD-10-CM

## 2021-05-13 PROCEDURE — 20610 DRAIN/INJ JOINT/BURSA W/O US: CPT | Performed by: PHYSICIAN ASSISTANT

## 2021-05-13 PROCEDURE — 99213 OFFICE O/P EST LOW 20 MIN: CPT | Performed by: PHYSICIAN ASSISTANT

## 2021-05-13 PROCEDURE — 3074F SYST BP LT 130 MM HG: CPT | Performed by: PHYSICIAN ASSISTANT

## 2021-05-13 PROCEDURE — 3008F BODY MASS INDEX DOCD: CPT | Performed by: PHYSICIAN ASSISTANT

## 2021-05-13 PROCEDURE — 1036F TOBACCO NON-USER: CPT | Performed by: PHYSICIAN ASSISTANT

## 2021-05-13 PROCEDURE — 3078F DIAST BP <80 MM HG: CPT | Performed by: PHYSICIAN ASSISTANT

## 2021-05-13 RX ORDER — METHYLPREDNISOLONE ACETATE 40 MG/ML
2 INJECTION, SUSPENSION INTRA-ARTICULAR; INTRALESIONAL; INTRAMUSCULAR; SOFT TISSUE
Status: COMPLETED | OUTPATIENT
Start: 2021-05-13 | End: 2021-05-13

## 2021-05-13 RX ORDER — LIDOCAINE HYDROCHLORIDE 10 MG/ML
3 INJECTION, SOLUTION INFILTRATION; PERINEURAL
Status: COMPLETED | OUTPATIENT
Start: 2021-05-13 | End: 2021-05-13

## 2021-05-13 RX ADMIN — LIDOCAINE HYDROCHLORIDE 3 ML: 10 INJECTION, SOLUTION INFILTRATION; PERINEURAL at 08:44

## 2021-05-13 RX ADMIN — METHYLPREDNISOLONE ACETATE 2 ML: 40 INJECTION, SUSPENSION INTRA-ARTICULAR; INTRALESIONAL; INTRAMUSCULAR; SOFT TISSUE at 08:44

## 2021-05-13 NOTE — PROGRESS NOTES
Assessment/Plan:  1  Primary osteoarthritis of left knee      Orders Placed This Encounter   Procedures    Large joint arthrocentesis       · Patient received left knee steroid injection in the office today  Tolerated the procedure well  Advised to apply ice and avoid strenuous activity for 1-2 days as needed  · Continue diclofenac tabs bid prn pain  Take with food  · Continue short hinge knee brace for comfort  · Patient aware he may try VS injection in the future if insufficient relief from CSI  Return in about 6 weeks (around 6/24/2021) for left knee follow   I answered all of the patient's questions during the visit and provided education of the patient's condition during the visit  The patient verbalized understanding of the information given and agrees with the plan  This note was dictated using [employer] software  It may contain errors including improperly dictated words  Please contact physician directly for any questions  Subjective   Chief Complaint:   Chief Complaint   Patient presents with    Left Knee - Follow-up       HPI  Amanchristian Clemente is a 63 y o  male who presents for follow up for left knee pain due to severe osteoarthritis  He states he is having pain over the anteromedial and anterolateral left knee  He notes occasional instability  Pain is worse with weight bearing and increase activities  He does wear short hinged knee brace when needed for comfort  He is taking Diclofenac 75 mg as needed for pain with relief  Patient does not have diabetes and has not received COVID vaccine  Review of Systems  ROS:    See HPI for musculoskeletal review     All other systems reviewed are negative     History:  Past Medical History:   Diagnosis Date    Allergic     Hypertension     Osteoarthritis of left knee 4/10/2020    Situational anxiety 9/27/2018     Past Surgical History:   Procedure Laterality Date    ANTERIOR CRUCIATE LIGAMENT REPAIR Left     SHOULDER SURGERY Right     R SHOULDER IMPINGEMENT SURGERY     Social History   Social History     Substance and Sexual Activity   Alcohol Use Yes    Frequency: Monthly or less    Drinks per session: 1 or 2    Binge frequency: Never    Comment: occasional     Social History     Substance and Sexual Activity   Drug Use No     Social History     Tobacco Use   Smoking Status Never Smoker   Smokeless Tobacco Never Used     Family History:   Family History   Problem Relation Age of Onset    Hypertension Mother     Hyperlipidemia Mother     Colon cancer Father        Current Outpatient Medications on File Prior to Visit   Medication Sig Dispense Refill    atorvastatin (LIPITOR) 20 mg tablet TAKE 1 TABLET DAILY 90 tablet 3    diclofenac (VOLTAREN) 75 mg EC tablet Take 1 tablet (75 mg total) by mouth 2 (two) times a day PRN for pain 60 tablet 1    Multiple Vitamin (MULTI-VITAMIN DAILY PO) Take 1 capsule by mouth      Testosterone 12 5 MG/ACT (1%) GEL 2 pumps daily       No current facility-administered medications on file prior to visit  Allergies   Allergen Reactions    No Known Allergies         Objective     /75   Pulse 65   Temp 97 5 °F (36 4 °C)   Ht 5' 11" (1 803 m)   Wt 96 6 kg (213 lb)   BMI 29 71 kg/m²      PE:  AAOx 3  WDWN  Hearing intact, no drainage from eyes  no audible wheezing  no abdominal distension  LE compartments soft, skin intact    Ortho Exam:  left Knee:   No erythema  no swelling  no effusion  no warmth  No TTP  AROM: 3- 120  Stable to varus/valgus stress      Large joint arthrocentesis: L knee  Universal Protocol:  Consent: Verbal consent obtained    Risks and benefits: risks, benefits and alternatives were discussed  Consent given by: patient  Site marked: the operative site was marked  Supporting Documentation  Indications: pain   Procedure Details  Location: knee - L knee  Preparation: Patient was prepped and draped in the usual sterile fashion  Needle size: 22 G  Ultrasound guidance: no  Approach: anterolateral  Medications administered: 3 mL lidocaine 1 %; 2 mL methylPREDNISolone acetate 40 mg/mL    Patient tolerance: patient tolerated the procedure well with no immediate complications  Dressing:  Sterile dressing applied

## 2021-11-29 ENCOUNTER — TELEMEDICINE (OUTPATIENT)
Dept: FAMILY MEDICINE CLINIC | Facility: CLINIC | Age: 56
End: 2021-11-29
Payer: COMMERCIAL

## 2021-11-29 VITALS — HEART RATE: 85 BPM | OXYGEN SATURATION: 95 %

## 2021-11-29 DIAGNOSIS — R50.9 FEVER, UNSPECIFIED FEVER CAUSE: Primary | ICD-10-CM

## 2021-11-29 DIAGNOSIS — R19.7 DIARRHEA, UNSPECIFIED TYPE: ICD-10-CM

## 2021-11-29 PROCEDURE — 99214 OFFICE O/P EST MOD 30 MIN: CPT | Performed by: NURSE PRACTITIONER

## 2021-11-29 PROCEDURE — U0005 INFEC AGEN DETEC AMPLI PROBE: HCPCS | Performed by: NURSE PRACTITIONER

## 2021-11-29 PROCEDURE — 1036F TOBACCO NON-USER: CPT | Performed by: NURSE PRACTITIONER

## 2021-11-29 PROCEDURE — U0003 INFECTIOUS AGENT DETECTION BY NUCLEIC ACID (DNA OR RNA); SEVERE ACUTE RESPIRATORY SYNDROME CORONAVIRUS 2 (SARS-COV-2) (CORONAVIRUS DISEASE [COVID-19]), AMPLIFIED PROBE TECHNIQUE, MAKING USE OF HIGH THROUGHPUT TECHNOLOGIES AS DESCRIBED BY CMS-2020-01-R: HCPCS | Performed by: NURSE PRACTITIONER

## 2021-11-30 LAB — SARS-COV-2 RNA RESP QL NAA+PROBE: POSITIVE

## 2021-12-01 ENCOUNTER — TELEMEDICINE (OUTPATIENT)
Dept: FAMILY MEDICINE CLINIC | Facility: CLINIC | Age: 56
End: 2021-12-01
Payer: COMMERCIAL

## 2021-12-01 ENCOUNTER — APPOINTMENT (OUTPATIENT)
Dept: RADIOLOGY | Age: 56
End: 2021-12-01
Payer: COMMERCIAL

## 2021-12-01 DIAGNOSIS — Z28.311 COVID-19 VACCINE SERIES NOT COMPLETED: ICD-10-CM

## 2021-12-01 DIAGNOSIS — U07.1 COVID-19 VIRUS INFECTION: ICD-10-CM

## 2021-12-01 DIAGNOSIS — R06.02 SOB (SHORTNESS OF BREATH): ICD-10-CM

## 2021-12-01 DIAGNOSIS — U07.1 COVID-19 VIRUS INFECTION: Primary | ICD-10-CM

## 2021-12-01 DIAGNOSIS — Z28.9 COVID-19 VACCINE SERIES NOT COMPLETED: ICD-10-CM

## 2021-12-01 PROCEDURE — 99214 OFFICE O/P EST MOD 30 MIN: CPT | Performed by: NURSE PRACTITIONER

## 2021-12-01 PROCEDURE — 71046 X-RAY EXAM CHEST 2 VIEWS: CPT

## 2021-12-01 RX ORDER — ONDANSETRON 2 MG/ML
4 INJECTION INTRAMUSCULAR; INTRAVENOUS ONCE AS NEEDED
Status: CANCELLED | OUTPATIENT
Start: 2021-12-03

## 2021-12-01 RX ORDER — SODIUM CHLORIDE 9 MG/ML
20 INJECTION, SOLUTION INTRAVENOUS ONCE
Status: CANCELLED | OUTPATIENT
Start: 2021-12-03

## 2021-12-01 RX ORDER — ACETAMINOPHEN 325 MG/1
650 TABLET ORAL ONCE AS NEEDED
Status: CANCELLED | OUTPATIENT
Start: 2021-12-03

## 2021-12-01 RX ORDER — ALBUTEROL SULFATE 90 UG/1
3 AEROSOL, METERED RESPIRATORY (INHALATION) ONCE AS NEEDED
Status: CANCELLED | OUTPATIENT
Start: 2021-12-03

## 2021-12-02 ENCOUNTER — TELEPHONE (OUTPATIENT)
Dept: FAMILY MEDICINE CLINIC | Facility: CLINIC | Age: 56
End: 2021-12-02

## 2021-12-03 ENCOUNTER — HOSPITAL ENCOUNTER (OUTPATIENT)
Dept: INFUSION CENTER | Facility: HOSPITAL | Age: 56
Discharge: HOME | End: 2021-12-03
Payer: COMMERCIAL

## 2021-12-03 VITALS
HEART RATE: 66 BPM | TEMPERATURE: 97.7 F | SYSTOLIC BLOOD PRESSURE: 111 MMHG | OXYGEN SATURATION: 97 % | RESPIRATION RATE: 18 BRPM | DIASTOLIC BLOOD PRESSURE: 65 MMHG

## 2021-12-03 DIAGNOSIS — U07.1 COVID-19 VIRUS INFECTION: Primary | ICD-10-CM

## 2021-12-03 PROCEDURE — M0245 HB BAMLAN AND ETESEV INF ADMIN: HCPCS | Performed by: FAMILY MEDICINE

## 2021-12-03 RX ORDER — ACETAMINOPHEN 325 MG/1
650 TABLET ORAL ONCE AS NEEDED
Status: DISCONTINUED | OUTPATIENT
Start: 2021-12-03 | End: 2021-12-06 | Stop reason: HOSPADM

## 2021-12-03 RX ORDER — ALBUTEROL SULFATE 90 UG/1
3 AEROSOL, METERED RESPIRATORY (INHALATION) ONCE AS NEEDED
Status: CANCELLED | OUTPATIENT
Start: 2021-12-03

## 2021-12-03 RX ORDER — SODIUM CHLORIDE 9 MG/ML
20 INJECTION, SOLUTION INTRAVENOUS ONCE
Status: COMPLETED | OUTPATIENT
Start: 2021-12-03 | End: 2021-12-03

## 2021-12-03 RX ORDER — ONDANSETRON 2 MG/ML
4 INJECTION INTRAMUSCULAR; INTRAVENOUS ONCE AS NEEDED
Status: CANCELLED | OUTPATIENT
Start: 2021-12-03

## 2021-12-03 RX ORDER — ALBUTEROL SULFATE 90 UG/1
3 AEROSOL, METERED RESPIRATORY (INHALATION) ONCE AS NEEDED
Status: DISCONTINUED | OUTPATIENT
Start: 2021-12-03 | End: 2021-12-06 | Stop reason: HOSPADM

## 2021-12-03 RX ORDER — ACETAMINOPHEN 325 MG/1
650 TABLET ORAL ONCE AS NEEDED
Status: CANCELLED | OUTPATIENT
Start: 2021-12-03

## 2021-12-03 RX ORDER — SODIUM CHLORIDE 9 MG/ML
20 INJECTION, SOLUTION INTRAVENOUS ONCE
Status: CANCELLED | OUTPATIENT
Start: 2021-12-03

## 2021-12-03 RX ORDER — ONDANSETRON 2 MG/ML
4 INJECTION INTRAMUSCULAR; INTRAVENOUS ONCE AS NEEDED
Status: DISCONTINUED | OUTPATIENT
Start: 2021-12-03 | End: 2021-12-06 | Stop reason: HOSPADM

## 2021-12-03 RX ADMIN — SODIUM CHLORIDE 20 ML/HR: 0.9 INJECTION, SOLUTION INTRAVENOUS at 14:15

## 2021-12-03 RX ADMIN — SODIUM CHLORIDE 2100 MG COMBINED: 9 INJECTION, SOLUTION INTRAVENOUS at 14:19

## 2021-12-04 ENCOUNTER — TELEMEDICINE (OUTPATIENT)
Dept: FAMILY MEDICINE CLINIC | Facility: CLINIC | Age: 56
End: 2021-12-04
Payer: COMMERCIAL

## 2021-12-04 DIAGNOSIS — U07.1 COVID-19 VIRUS INFECTION: Primary | ICD-10-CM

## 2021-12-04 PROCEDURE — 1036F TOBACCO NON-USER: CPT | Performed by: NURSE PRACTITIONER

## 2021-12-04 PROCEDURE — 99213 OFFICE O/P EST LOW 20 MIN: CPT | Performed by: NURSE PRACTITIONER

## 2021-12-08 ENCOUNTER — TELEPHONE (OUTPATIENT)
Dept: FAMILY MEDICINE CLINIC | Facility: CLINIC | Age: 56
End: 2021-12-08

## 2022-01-12 ENCOUNTER — OFFICE VISIT (OUTPATIENT)
Dept: FAMILY MEDICINE CLINIC | Facility: CLINIC | Age: 57
End: 2022-01-12
Payer: COMMERCIAL

## 2022-01-12 VITALS
TEMPERATURE: 97.5 F | WEIGHT: 217 LBS | HEART RATE: 67 BPM | DIASTOLIC BLOOD PRESSURE: 72 MMHG | SYSTOLIC BLOOD PRESSURE: 118 MMHG | OXYGEN SATURATION: 95 % | HEIGHT: 71 IN | BODY MASS INDEX: 30.38 KG/M2 | RESPIRATION RATE: 16 BRPM

## 2022-01-12 DIAGNOSIS — I10 ESSENTIAL HYPERTENSION: ICD-10-CM

## 2022-01-12 DIAGNOSIS — R73.01 IFG (IMPAIRED FASTING GLUCOSE): ICD-10-CM

## 2022-01-12 DIAGNOSIS — Z23 NEED FOR ZOSTER VACCINATION: ICD-10-CM

## 2022-01-12 DIAGNOSIS — E66.09 CLASS 1 OBESITY DUE TO EXCESS CALORIES WITH SERIOUS COMORBIDITY AND BODY MASS INDEX (BMI) OF 30.0 TO 30.9 IN ADULT: ICD-10-CM

## 2022-01-12 DIAGNOSIS — E78.2 MIXED HYPERLIPIDEMIA: ICD-10-CM

## 2022-01-12 DIAGNOSIS — K63.5 POLYP OF COLON, UNSPECIFIED PART OF COLON, UNSPECIFIED TYPE: ICD-10-CM

## 2022-01-12 DIAGNOSIS — Z00.01 ENCOUNTER FOR WELL ADULT EXAM WITH ABNORMAL FINDINGS: Primary | ICD-10-CM

## 2022-01-12 DIAGNOSIS — Z12.5 SCREENING PSA (PROSTATE SPECIFIC ANTIGEN): ICD-10-CM

## 2022-01-12 DIAGNOSIS — Z71.89 EDUCATED ABOUT COVID-19 VIRUS INFECTION: ICD-10-CM

## 2022-01-12 DIAGNOSIS — E23.0 HYPOGONADOTROPIC HYPOGONADISM SYNDROME, MALE (HCC): ICD-10-CM

## 2022-01-12 PROBLEM — R50.9 FEVER: Status: RESOLVED | Noted: 2021-11-29 | Resolved: 2022-01-12

## 2022-01-12 PROBLEM — R19.7 DIARRHEA: Status: RESOLVED | Noted: 2021-11-29 | Resolved: 2022-01-12

## 2022-01-12 PROBLEM — R06.02 SOB (SHORTNESS OF BREATH): Status: RESOLVED | Noted: 2021-12-01 | Resolved: 2022-01-12

## 2022-01-12 PROBLEM — E66.811 CLASS 1 OBESITY DUE TO EXCESS CALORIES WITH SERIOUS COMORBIDITY AND BODY MASS INDEX (BMI) OF 30.0 TO 30.9 IN ADULT: Status: ACTIVE | Noted: 2018-07-11

## 2022-01-12 PROCEDURE — 90750 HZV VACC RECOMBINANT IM: CPT

## 2022-01-12 PROCEDURE — 90471 IMMUNIZATION ADMIN: CPT

## 2022-01-12 PROCEDURE — 3008F BODY MASS INDEX DOCD: CPT | Performed by: FAMILY MEDICINE

## 2022-01-12 PROCEDURE — 3725F SCREEN DEPRESSION PERFORMED: CPT | Performed by: FAMILY MEDICINE

## 2022-01-12 PROCEDURE — 99214 OFFICE O/P EST MOD 30 MIN: CPT | Performed by: FAMILY MEDICINE

## 2022-01-12 PROCEDURE — 99396 PREV VISIT EST AGE 40-64: CPT | Performed by: FAMILY MEDICINE

## 2022-01-12 PROCEDURE — 1036F TOBACCO NON-USER: CPT | Performed by: FAMILY MEDICINE

## 2022-01-12 NOTE — PROGRESS NOTES
[address] [place] [address] PRIMARY CARE [facility]    NAME: Matt Esqueda  AGE: 61 y o  SEX: male  : 1965     DATE: 2022     Assessment and Plan:     Problem List Items Addressed This Visit     None          Immunizations and preventive care screenings were discussed with patient today  Appropriate education was printed on patient's after visit summary  Counseling:  Alcohol/drug use: discussed moderation in alcohol intake, the recommendations for healthy alcohol use, and avoidance of illicit drug use  Dental Health: discussed importance of regular tooth brushing, flossing, and dental visits  Injury prevention: discussed safety/seat belts, safety helmets, smoke detectors, carbon dioxide detectors, and smoking near bedding or upholstery  Sexual health: discussed sexually transmitted diseases, partner selection, use of condoms, avoidance of unintended pregnancy, and contraceptive alternatives  · Exercise: the importance of regular exercise/physical activity was discussed  Recommend exercise 3-5 times per week for at least 30 minutes  BMI Counseling: Body mass index is 30 27 kg/m²  The BMI is above normal  Nutrition recommendations include decreasing portion sizes, decreasing fast food intake and limiting drinks that contain sugar  Exercise recommendations include exercising 3-5 times per week  No pharmacotherapy was ordered  Patient referred to PCP  Rationale for BMI follow-up plan is due to patient being overweight or obese  Depression Screening and Follow-up Plan: Patient was screened for depression during today's encounter  They screened negative with a PHQ-2 score of 0  No follow-ups on file       Chief Complaint:     Chief Complaint   Patient presents with    Physical Exam     patient is here today for his yearly physical exam       History of Present Illness:     Adult Annual Physical   Patient here for a comprehensive physical exam  The patient reports F/up with chronic condition  Diet and Physical Activity  · Diet/Nutrition:  no special diet  · Exercise: sporadically  Depression Screening  PHQ-2/9 Depression Screening    Little interest or pleasure in doing things: 0 - not at all  Feeling down, depressed, or hopeless: 0 - not at all  PHQ-2 Score: 0  PHQ-2 Interpretation: Negative depression screen       General Health  · Sleep: gets 7-8 hours of sleep on average  · Hearing: normal - bilateral   · Vision: wears glasses  · Dental: regular dental visits   Health  · Symptoms include: none     Review of Systems:     Review of Systems   Constitutional: Negative for activity change, appetite change, fatigue and fever  HENT: Negative for congestion, ear pain, sinus pressure, sinus pain and sore throat  Eyes: Negative for pain, discharge, redness and itching  Respiratory: Negative for cough, chest tightness, shortness of breath and stridor  Cardiovascular: Negative for chest pain, palpitations and leg swelling  Gastrointestinal: Negative for abdominal pain, blood in stool, constipation, diarrhea and nausea  Genitourinary: Negative for dysuria, flank pain, frequency and hematuria  Musculoskeletal: Negative for back pain, joint swelling and neck pain  Skin: Negative for pallor and rash  Neurological: Negative for dizziness, tremors, weakness, numbness and headaches  Hematological: Does not bruise/bleed easily        Past Medical History:     Past Medical History:   Diagnosis Date    Allergic     Hypertension     Osteoarthritis of left knee 4/10/2020    Situational anxiety 9/27/2018      Past Surgical History:     Past Surgical History:   Procedure Laterality Date    ANTERIOR CRUCIATE LIGAMENT REPAIR Left     SHOULDER SURGERY Right     R SHOULDER IMPINGEMENT SURGERY      Family History:     Family History   Problem Relation Age of Onset    Hypertension Mother     Hyperlipidemia Mother    Duglas Garcia Colon cancer Father       Social History:     Social History     Socioeconomic History    Marital status: Single     Spouse name: None    Number of children: None    Years of education: None    Highest education level: None   Occupational History    None   Tobacco Use    Smoking status: Never Smoker    Smokeless tobacco: Never Used   Vaping Use    Vaping Use: Never used   Substance and Sexual Activity    Alcohol use: Yes     Comment: occasional    Drug use: No    Sexual activity: Yes     Partners: Female   Other Topics Concern    None   Social History Narrative    MARITAL STATUS:  AS PER [employer]    HAS CHILDREN    HAND DOMINANCE: RIGHT     Social Determinants of Health     Financial Resource Strain: Low Risk     Difficulty of Paying Living Expenses: Not hard at all   Food Insecurity: No Food Insecurity    Worried About Running Out of Food in the Last Year: Never true    Dennise of Food in the Last Year: Never true   Transportation Needs: No Transportation Needs    Lack of Transportation (Medical): No    Lack of Transportation (Non-Medical): No   Physical Activity: Sufficiently Active    Days of Exercise per Week: 4 days    Minutes of Exercise per Session: 60 min   Stress: No Stress Concern Present    Feeling of Stress : Not at all   Social Connections:  Moderately Integrated    Frequency of Communication with Friends and Family: More than three times a week    Frequency of Social Gatherings with Friends and Family: More than three times a week    Attends Taoist Services: More than 4 times per year    Active Member of [employer] or Organizations: No    Attends Club or Organization Meetings: Never    Marital Status:    Intimate Partner Violence: Not At Risk    Fear of Current or Ex-Partner: No    Emotionally Abused: No    Physically Abused: No    Sexually Abused: No   Housing Stability: Unknown    Unable to Pay for Housing in the Last Year: No    Number of Places Lived in the Last Year: Not on file    Unstable Housing in the Last Year: No      Current Medications:     Current Outpatient Medications   Medication Sig Dispense Refill    atorvastatin (LIPITOR) 20 mg tablet TAKE 1 TABLET DAILY 90 tablet 3    Multiple Vitamin (MULTI-VITAMIN DAILY PO) Take 1 capsule by mouth      Testosterone 12 5 MG/ACT (1%) GEL 2 pumps daily      diclofenac (VOLTAREN) 75 mg EC tablet Take 1 tablet (75 mg total) by mouth 2 (two) times a day PRN for pain (Patient not taking: Reported on 1/12/2022 ) 60 tablet 1     No current facility-administered medications for this visit  Allergies: Allergies   Allergen Reactions    No Known Allergies       Physical Exam:     /72 (BP Location: Left arm, Patient Position: Sitting, Cuff Size: Large)   Pulse 67   Temp 97 5 °F (36 4 °C) (Tympanic)   Resp 16   Ht 5' 11" (1 803 m)   Wt 98 4 kg (217 lb)   SpO2 95%   BMI 30 27 kg/m²     Physical Exam  Vitals and nursing note reviewed  Constitutional:       General: He is not in acute distress  Appearance: Normal appearance  He is not ill-appearing, toxic-appearing or diaphoretic  HENT:      Head: Normocephalic  Right Ear: Tympanic membrane, ear canal and external ear normal  There is no impacted cerumen  Left Ear: Ear canal and external ear normal  There is no impacted cerumen  Nose: Nose normal  No congestion or rhinorrhea  Mouth/Throat:      Mouth: Mucous membranes are moist       Pharynx: Oropharynx is clear  No oropharyngeal exudate or posterior oropharyngeal erythema  Eyes:      General:         Right eye: No discharge  Left eye: No discharge  Conjunctiva/sclera: Conjunctivae normal       Pupils: Pupils are equal, round, and reactive to light  Neck:      Vascular: No JVD  Cardiovascular:      Rate and Rhythm: Normal rate and regular rhythm  Heart sounds: Normal heart sounds  No murmur heard        Pulmonary:      Effort: Pulmonary effort is normal  Breath sounds: Normal breath sounds  No wheezing or rales  Abdominal:      General: There is no distension  Palpations: Abdomen is soft  Tenderness: There is no abdominal tenderness  Hernia: No hernia is present  Musculoskeletal:         General: No deformity  Normal range of motion  Cervical back: Normal range of motion  Right lower leg: No edema  Left lower leg: No edema  Lymphadenopathy:      Cervical: No cervical adenopathy  Skin:     General: Skin is warm  Coloration: Skin is not jaundiced  Findings: No bruising, erythema or rash  Neurological:      General: No focal deficit present  Mental Status: He is alert and oriented to person, place, and time  Sensory: No sensory deficit  Motor: No weakness        Gait: Gait normal    Psychiatric:         Mood and Affect: Mood normal          Behavior: Behavior normal           Stephanie Yao MD  [address] [place] [address]

## 2022-01-12 NOTE — ASSESSMENT & PLAN NOTE
Chronic asymptomatic blood pressure will control on current diet continue monitor it the low-salt diet and important lose weight discussed the patient

## 2022-01-12 NOTE — PATIENT INSTRUCTIONS

## 2022-01-12 NOTE — ASSESSMENT & PLAN NOTE
Chronic asymptomatic patient try to manage it with diet he is overdue for blood work we order CMP today

## 2022-01-12 NOTE — PROGRESS NOTES
Subjective:   Chief Complaint   Patient presents with    Physical Exam     patient is here today for his yearly physical exam         Patient ID: Christiano Estrada is a 61 y o  male  Patient here for well exam follow-up with a chronic condition history of impaired fasting glucose hypertension hyperlipidemia hypogonadism patient been taking atorvastatin and it is testing on gel compliant with the medication tolerated well without side effect since been seen last time patient had the COVID infection and the hand his symptom improved no recent blood work review the record he had a colonoscopy 2017 with the polyp the recommend to repeated in 5 years      The following portions of the patient's history were reviewed and updated as appropriate: allergies, current medications, past family history, past medical history, past social history, past surgical history and problem list     Review of Systems   Constitutional: Negative for activity change, appetite change, fatigue and fever  HENT: Negative for congestion, ear pain, sinus pressure, sinus pain and sore throat  Eyes: Negative for pain, discharge, redness and itching  Respiratory: Negative for cough, chest tightness, shortness of breath and stridor  Cardiovascular: Negative for chest pain, palpitations and leg swelling  Gastrointestinal: Negative for abdominal pain, blood in stool, constipation, diarrhea and nausea  Genitourinary: Negative for dysuria, flank pain, frequency and hematuria  Musculoskeletal: Negative for back pain, joint swelling and neck pain  Skin: Negative for pallor and rash  Neurological: Negative for dizziness, tremors, weakness, numbness and headaches  Hematological: Does not bruise/bleed easily               Objective:  Vitals:    01/12/22 0748   BP: 118/72   BP Location: Left arm   Patient Position: Sitting   Cuff Size: Large   Pulse: 67   Resp: 16   Temp: 97 5 °F (36 4 °C)   TempSrc: Tympanic   SpO2: 95%   Weight: 98 4 kg (217 lb)   Height: 5' 11" (1 803 m)      Physical Exam  Vitals and nursing note reviewed  Constitutional:       General: He is not in acute distress  Appearance: Normal appearance  He is well-developed  He is not diaphoretic  HENT:      Head: Normocephalic  Right Ear: Tympanic membrane, ear canal and external ear normal       Left Ear: Tympanic membrane, ear canal and external ear normal       Nose: Nose normal  No congestion or rhinorrhea  Mouth/Throat:      Mouth: Mucous membranes are moist       Pharynx: Oropharynx is clear  No oropharyngeal exudate or posterior oropharyngeal erythema  Eyes:      General:         Right eye: No discharge  Left eye: No discharge  Conjunctiva/sclera: Conjunctivae normal    Neck:      Vascular: No JVD  Cardiovascular:      Rate and Rhythm: Normal rate and regular rhythm  Heart sounds: Normal heart sounds  No murmur heard  No gallop  Pulmonary:      Effort: Pulmonary effort is normal  No respiratory distress  Breath sounds: Normal breath sounds  No stridor  No wheezing or rales  Chest:      Chest wall: No tenderness  Abdominal:      General: There is no distension  Palpations: Abdomen is soft  There is no mass  Tenderness: There is no abdominal tenderness  There is no rebound  Musculoskeletal:         General: No tenderness  Normal range of motion  Cervical back: Normal range of motion and neck supple  Lymphadenopathy:      Cervical: No cervical adenopathy  Skin:     General: Skin is warm  Findings: No erythema or rash  Neurological:      Mental Status: He is alert and oriented to person, place, and time  Sensory: No sensory deficit        Gait: Gait normal    Psychiatric:         Mood and Affect: Mood normal          Behavior: Behavior normal            Assessment/Plan:    Encounter for well adult exam with abnormal findings  Advice and education were given regarding nutrition, aerobic exercises, weight bearing exercises, cardiovascular risk reduction, fall risk reduction, and age appropriate supplements  The patient was counseled regarding instructions for management, risk factor reductions, prognosis, risks and benefits of treatment options, patient and family education, and importance of compliance with treatment  Discussed the proper immunization including shingle vaccine he is interested will start 1st dose today Shingrix and he declined flu shot    Educated about COVID-19 virus infection  A discussed with the patient benefit versus side effect of COVID vaccine and he decline for today and he will think about it and let us know if he is interested    Class 1 obesity due to excess calories with serious comorbidity and body mass index (BMI) of 30 0 to 30 9 in adult  The BMI is above average  BMI counseling and education was provided to the patient  Nutrition recommendations include reducing portion sizes, decreasing overall calorie intake, 3-5 servings of fruits/vegetables daily, reducing fast food intake, consuming healthier snacks, decreasing soda and/or juice intake, moderation in carbohydrate intake and reducing intake of saturated fat and trans fat  Exercise recommendations include moderate aerobic physical activity for 150 minutes/week, exercising 3-5 times per week and joining a gym      Hypogonadotropic hypogonadism syndrome, male [facility])  Chronic patient on testosterone gel follow up with the Urology periodically who managed medication    Polyp of colon  Patient had a colonoscopy 2017 he had a polyp recommend to repeated in 5 years discussed the patient is due for 1 to contact GI for appointment    IFG (impaired fasting glucose)  Chronic asymptomatic patient try to manage it with diet he is overdue for blood work we order CMP today    Hyperlipidemia  A chronic asymptomatic on atorvastatin 20 mg continue current management overdue for blood work will order today which check CMP lipid panel    Essential hypertension  Chronic asymptomatic blood pressure will control on current diet continue monitor it the low-salt diet and important lose weight discussed the patient        Diagnoses and all orders for this visit:    Encounter for well adult exam with abnormal findings    Class 1 obesity due to excess calories with serious comorbidity and body mass index (BMI) of 30 0 to 30 9 in adult  -     CBC and differential; Future  -     Comprehensive metabolic panel; Future  -     Lipid panel; Future  -     TSH, 3rd generation with Free T4 reflex; Future    Educated about COVID-19 virus infection    Hypogonadotropic hypogonadism syndrome, male ([facility] [address] )    Polyp of colon, unspecified part of colon, unspecified type    Mixed hyperlipidemia  -     CBC and differential; Future  -     Comprehensive metabolic panel; Future  -     Lipid panel; Future  -     TSH, 3rd generation with Free T4 reflex; Future    Essential hypertension  -     CBC and differential; Future  -     Comprehensive metabolic panel; Future  -     Lipid panel; Future  -     TSH, 3rd generation with Free T4 reflex; Future    IFG (impaired fasting glucose)  -     CBC and differential; Future  -     Comprehensive metabolic panel; Future  -     Lipid panel; Future  -     TSH, 3rd generation with Free T4 reflex; Future    Screening PSA (prostate specific antigen)  -     PSA, Total Screen;  Future    Need for zoster vaccination  -     Zoster Vaccine Recombinant IM

## 2022-01-12 NOTE — ASSESSMENT & PLAN NOTE
A chronic asymptomatic on atorvastatin 20 mg continue current management overdue for blood work will order today which check CMP lipid panel

## 2022-01-12 NOTE — ASSESSMENT & PLAN NOTE
Patient had a colonoscopy 2017 he had a polyp recommend to repeated in 5 years discussed the patient is due for 1 to contact GI for appointment

## 2022-01-12 NOTE — ASSESSMENT & PLAN NOTE
Advice and education were given regarding nutrition, aerobic exercises, weight bearing exercises, cardiovascular risk reduction, fall risk reduction, and age appropriate supplements  The patient was counseled regarding instructions for management, risk factor reductions, prognosis, risks and benefits of treatment options, patient and family education, and importance of compliance with treatment       Discussed the proper immunization including shingle vaccine he is interested will start 1st dose today Shingrix and he declined flu shot

## 2022-01-12 NOTE — ASSESSMENT & PLAN NOTE
A discussed with the patient benefit versus side effect of COVID vaccine and he decline for today and he will think about it and let us know if he is interested

## 2022-05-09 ENCOUNTER — RA CDI HCC (OUTPATIENT)
Dept: OTHER | Facility: HOSPITAL | Age: 57
End: 2022-05-09

## 2022-05-09 NOTE — PROGRESS NOTES
[facility]  [facility]       Chart reviewed, no opportunity found: CHART REVIEWED, NO OPPORTUNITY FOUND        Patients Insurance        Commercial Insurance: [address] [place] [address]

## 2022-05-17 ENCOUNTER — OFFICE VISIT (OUTPATIENT)
Dept: FAMILY MEDICINE CLINIC | Facility: CLINIC | Age: 57
End: 2022-05-17
Payer: COMMERCIAL

## 2022-05-17 VITALS
HEIGHT: 71 IN | RESPIRATION RATE: 16 BRPM | BODY MASS INDEX: 29.82 KG/M2 | HEART RATE: 65 BPM | OXYGEN SATURATION: 98 % | WEIGHT: 213 LBS | TEMPERATURE: 96.8 F | DIASTOLIC BLOOD PRESSURE: 82 MMHG | SYSTOLIC BLOOD PRESSURE: 112 MMHG

## 2022-05-17 DIAGNOSIS — E78.2 MIXED HYPERLIPIDEMIA: ICD-10-CM

## 2022-05-17 DIAGNOSIS — R73.01 IFG (IMPAIRED FASTING GLUCOSE): Primary | ICD-10-CM

## 2022-05-17 DIAGNOSIS — E23.0 HYPOGONADOTROPIC HYPOGONADISM SYNDROME, MALE (HCC): ICD-10-CM

## 2022-05-17 DIAGNOSIS — Z23 NEED FOR SHINGLES VACCINE: ICD-10-CM

## 2022-05-17 PROCEDURE — 99214 OFFICE O/P EST MOD 30 MIN: CPT | Performed by: FAMILY MEDICINE

## 2022-05-17 PROCEDURE — 3725F SCREEN DEPRESSION PERFORMED: CPT | Performed by: FAMILY MEDICINE

## 2022-05-17 PROCEDURE — 1036F TOBACCO NON-USER: CPT | Performed by: FAMILY MEDICINE

## 2022-05-17 PROCEDURE — 90471 IMMUNIZATION ADMIN: CPT

## 2022-05-17 PROCEDURE — 90750 HZV VACC RECOMBINANT IM: CPT

## 2022-05-17 PROCEDURE — 3008F BODY MASS INDEX DOCD: CPT | Performed by: FAMILY MEDICINE

## 2022-05-17 NOTE — PROGRESS NOTES
Subjective:   Chief Complaint   Patient presents with    Follow-up     Chronic conditions        Patient ID: Raiza Tran is a 61 y o  male  The patient here follow-up for the chronic condition and patient compliant with the medication tolerated well without side effect [place] on going to the gym at high to lose weight no new concerns recent blood work reviewed with the patient      The following portions of the patient's history were reviewed and updated as appropriate: allergies, current medications, past family history, past medical history, past social history, past surgical history and problem list     Review of Systems   Constitutional: Negative for activity change, appetite change, fatigue and fever  HENT: Negative for congestion, ear pain, sinus pressure, sinus pain and sore throat  Eyes: Negative for pain, discharge, redness and itching  Respiratory: Negative for cough, chest tightness, shortness of breath and stridor  Cardiovascular: Negative for chest pain, palpitations and leg swelling  Gastrointestinal: Negative for abdominal pain, blood in stool, constipation, diarrhea and nausea  Genitourinary: Negative for dysuria, flank pain, frequency and hematuria  Musculoskeletal: Negative for back pain, joint swelling and neck pain  Skin: Negative for pallor and rash  Neurological: Negative for dizziness, tremors, weakness, numbness and headaches  Hematological: Does not bruise/bleed easily  Objective:  Vitals:    05/17/22 0729   BP: 112/82   BP Location: Left arm   Patient Position: Sitting   Cuff Size: Large   Pulse: 65   Resp: 16   Temp: (!) 96 8 °F (36 °C)   TempSrc: Tympanic   SpO2: 98%   Weight: 96 6 kg (213 lb)   Height: 5' 11" (1 803 m)      Physical Exam  Vitals and nursing note reviewed  Constitutional:       General: He is not in acute distress  Appearance: Normal appearance  He is well-developed  He is not diaphoretic  HENT:      Head: Normocephalic  Right Ear: Tympanic membrane, ear canal and external ear normal       Left Ear: Tympanic membrane, ear canal and external ear normal       Nose: Nose normal  No congestion or rhinorrhea  Mouth/Throat:      Mouth: Mucous membranes are moist       Pharynx: Oropharynx is clear  No oropharyngeal exudate or posterior oropharyngeal erythema  Eyes:      General:         Right eye: No discharge  Left eye: No discharge  Conjunctiva/sclera: Conjunctivae normal    Neck:      Vascular: No JVD  Cardiovascular:      Rate and Rhythm: Normal rate and regular rhythm  Heart sounds: Normal heart sounds  No murmur heard  No gallop  Pulmonary:      Effort: Pulmonary effort is normal  No respiratory distress  Breath sounds: Normal breath sounds  No stridor  No wheezing or rales  Chest:      Chest wall: No tenderness  Abdominal:      General: There is no distension  Palpations: Abdomen is soft  There is no mass  Tenderness: There is no abdominal tenderness  There is no rebound  Musculoskeletal:         General: No tenderness  Normal range of motion  Cervical back: Normal range of motion and neck supple  Lymphadenopathy:      Cervical: No cervical adenopathy  Skin:     General: Skin is warm  Findings: No erythema or rash  Neurological:      Mental Status: He is alert and oriented to person, place, and time  Sensory: No sensory deficit        Gait: Gait normal    Psychiatric:         Mood and Affect: Mood normal          Behavior: Behavior normal            Assessment/Plan:    Hyperlipidemia  Chronic asymptomatic vanc until the atorvastatin 20 mg once a day continue current management    IFG (impaired fasting glucose)  Chronic asymptomatic until encourage patient to continue with low carb diet    Hypogonadotropic hypogonadism syndrome, male ([address] )  Chronic on testosterone gel follow up with the Urology tolerated well without side effects       Diagnoses and all orders for this visit:    IFG (impaired fasting glucose)  -     Basic metabolic panel; Future  -     Lipid Panel with Direct LDL reflex; Future    Mixed hyperlipidemia  -     Basic metabolic panel; Future  -     Lipid Panel with Direct LDL reflex;  Future    Need for shingles vaccine  Comments:  tolerated  well without any side effects  Orders:  -     Zoster Vaccine Recombinant IM    Hypogonadotropic hypogonadism syndrome, male ([facility] [address] )

## 2022-06-28 ENCOUNTER — OFFICE VISIT (OUTPATIENT)
Dept: CARDIOLOGY CLINIC | Facility: CLINIC | Age: 57
End: 2022-06-28
Payer: COMMERCIAL

## 2022-06-28 VITALS
SYSTOLIC BLOOD PRESSURE: 120 MMHG | HEART RATE: 66 BPM | DIASTOLIC BLOOD PRESSURE: 80 MMHG | WEIGHT: 214 LBS | OXYGEN SATURATION: 97 % | HEIGHT: 71 IN | BODY MASS INDEX: 29.96 KG/M2 | TEMPERATURE: 95.5 F

## 2022-06-28 DIAGNOSIS — I10 ESSENTIAL HYPERTENSION: ICD-10-CM

## 2022-06-28 DIAGNOSIS — I49.3 PVC (PREMATURE VENTRICULAR CONTRACTION): Primary | ICD-10-CM

## 2022-06-28 PROBLEM — R94.31 HOLTER MONITOR, ABNORMAL: Status: RESOLVED | Noted: 2020-10-20 | Resolved: 2022-06-28

## 2022-06-28 PROCEDURE — 1036F TOBACCO NON-USER: CPT | Performed by: INTERNAL MEDICINE

## 2022-06-28 PROCEDURE — 93000 ELECTROCARDIOGRAM COMPLETE: CPT | Performed by: INTERNAL MEDICINE

## 2022-06-28 PROCEDURE — 99214 OFFICE O/P EST MOD 30 MIN: CPT | Performed by: INTERNAL MEDICINE

## 2022-06-28 PROCEDURE — 3008F BODY MASS INDEX DOCD: CPT | Performed by: INTERNAL MEDICINE

## 2022-06-28 NOTE — PROGRESS NOTES
Cardiology Follow Up    Cheyenne Oscar  [id number]  1965  [facility] [address] [place] [address] [place]  [id number]  [phone]      1  PVC (premature ventricular contraction)  POCT ECG   2  Essential hypertension  POCT ECG       Discussion/Summary:      Rare PVCs, PACs previously  Testing was reassuring  His symptoms are very stable  Dyslipidemia managed with atorvastatin  Can contact me and return with any change in symptoms which may prompt repeat evaluation, otherwise ok to follow up as needed as he follows very carefully with his PCP  Previous History:    Seen in the past for PVCs, palpitations  Testing was benign with echo, stress test   His holter showed an overall low burden of PACs and PVCs (< 1%)  Managed conservatively  He has done well since last visit  Palpitations overall are very rare  Not bothersome  No other associated symptoms  Exercising, working out  No limitations  [facility] earlier this year, all reviewed with patient  Doing well with his current dose of atorvastatin        Patient Active Problem List   Diagnosis    Essential hypertension    Hyperlipidemia    Hypogonadotropic hypogonadism syndrome, male ([address] )    Snoring    Class 1 obesity due to excess calories with serious comorbidity and body mass index (BMI) of 30 0 to 30 9 in adult    Situational anxiety    Dyspepsia    Encounter for well adult exam with abnormal findings    Chronic pain of left knee    Family history of colon cancer in father    Osteoarthritis of left knee    PVC (premature ventricular contraction)    Rib pain on left side    IFG (impaired fasting glucose)    Leucocytosis    COVID-19 virus infection    COVID-19 vaccine series not completed    Educated about COVID-19 virus infection    Polyp of colon     Past Medical History:   Diagnosis Date    Allergic     Diarrhea 11/29/2021    Fever 11/29/2021    Hyperkalemia 8/8/2016    Hypertension     Osteoarthritis of left knee 4/10/2020    Situational anxiety 9/27/2018    SOB (shortness of breath) 12/1/2021     Social History     Tobacco Use    Smoking status: Never Smoker    Smokeless tobacco: Never Used   Vaping Use    Vaping Use: Never used   Substance Use Topics    Alcohol use: Yes     Comment: occasional    Drug use: No      Family History   Problem Relation Age of Onset    Hypertension Mother     Hyperlipidemia Mother     Colon cancer Father      Past Surgical History:   Procedure Laterality Date    ANTERIOR CRUCIATE LIGAMENT REPAIR Left     SHOULDER SURGERY Right     R SHOULDER IMPINGEMENT SURGERY       Current Outpatient Medications:     atorvastatin (LIPITOR) 20 mg tablet, TAKE 1 TABLET DAILY, Disp: 90 tablet, Rfl: 1    Multiple Vitamin (MULTI-VITAMIN DAILY PO), Take 1 capsule by mouth, Disp: , Rfl:     Testosterone 12 5 MG/ACT (1%) GEL, 2 pumps daily, Disp: , Rfl:   Allergies   Allergen Reactions    No Known Allergies        Vitals:    06/28/22 0800   BP: 120/80   BP Location: Left arm   Patient Position: Sitting   Cuff Size: Standard   Pulse: 66   Temp: (!) 95 5 °F (35 3 °C)   SpO2: 97%   Weight: 97 1 kg (214 lb)   Height: 5' 11" (1 803 m)     Vitals:    06/28/22 0800   Weight: 97 1 kg (214 lb)      Height: 5' 11" (180 3 cm)   Body mass index is 29 85 kg/m²      Physical Exam:  GEN: Jeffery Blodusty appears well, alert and oriented x 3, pleasant and cooperative   HEENT: pupils equal, round, and reactive to light; extraocular muscles intact  NECK: supple, no carotid bruits   HEART: regular rhythm, normal S1 and S2, no murmurs, clicks, gallops or rubs   LUNGS: clear to auscultation bilaterally; no wheezes, rales, or rhonchi   ABDOMEN: normal bowel sounds, soft, no tenderness, no distention  EXTREMITIES: peripheral pulses normal; no clubbing, cyanosis, or edema  NEURO: no focal findings   SKIN: normal without suspicious lesions on exposed skin      ROS:  Except as noted in HPI, is otherwise reviewed in detail and a 12 point review of systems is negative  ROS reviewed and is unchanged    Labs:  Reviewed in care everywhere    Testing:  Stress test 11/12/2020:  STRESS SUMMARY: Duration of exercise was 10 min  The patient exercised to protocol stage 4  Maximal work rate was 13 3 METs  Functional capacity was normal  Maximal heart rate during stress was 160 bpm ( 98 % of maximal predicted heart  rate)  The heart rate response to stress was normal  There was normal resting blood pressure with an appropriate response to stress  The rate-pressure product for the peak heart rate and blood pressure was 62307  There was no chest pain  during stress  The stress test was terminated due to achievement of maximal (symptom limited) exercise and fatigue  The stress ECG was negative for ischemia  There was upsloping ST-depressions with significant baseline variation  There  were no stress arrhythmias or conduction abnormalities  Based on [employer], this patient was at low risk for cardiac events      SUMMARY:  -  Rest ECG: Normal sinus rhythm  -  Stress results: Duration of exercise was 10 min  Maximal work rate was 13 3 METs  Functional capacity was normal  Target heart rate was achieved  There was no chest pain during stress  -  ECG conclusions: The stress ECG was negative for ischemia  There was upsloping ST-depressions with significant baseline variation  There were no stress arrhythmias or conduction abnormalities  Based on [employer] Scoring, this  patient was at low risk for cardiac events      IMPRESSIONS: Normal study after maximal exercise without reproduction of symptoms  Echo 9/29/20:  LEFT VENTRICLE:  Systolic function was normal  Ejection fraction was estimated to be 60 %    There were no regional wall motion abnormalities      RIGHT VENTRICLE:  The size was normal   Systolic function was normal      MITRAL VALVE:  There was trace regurgitation      TRICUSPID VALVE:  There was trace regurgitation  Pulmonary artery systolic pressure was within the normal range      Holter 9/29/20:  The patient was in sinus rhythm throughout the recording      Minimum HR: 45  Average HR: 67  Maximum HR: 115     Premature ventricular contractions: 1242 (<1%)  Ventricular runs: 0     Supraventricular contractions: 830 (<1%)  Supraventricular runs: 1, lasting 5 beats     Longest RR: 1 4 sec     Arrhythmias: none     Diary submitted: yes, but no symptoms were reported    EKG:  Sinus rhythm, 66 BPM  Normal EKG

## 2022-09-29 ENCOUNTER — VBI (OUTPATIENT)
Dept: ADMINISTRATIVE | Facility: OTHER | Age: 57
End: 2022-09-29

## 2022-11-18 ENCOUNTER — OFFICE VISIT (OUTPATIENT)
Dept: FAMILY MEDICINE CLINIC | Facility: CLINIC | Age: 57
End: 2022-11-18

## 2022-11-18 VITALS
HEIGHT: 71 IN | OXYGEN SATURATION: 95 % | WEIGHT: 213 LBS | DIASTOLIC BLOOD PRESSURE: 80 MMHG | BODY MASS INDEX: 29.82 KG/M2 | TEMPERATURE: 97.1 F | SYSTOLIC BLOOD PRESSURE: 122 MMHG | RESPIRATION RATE: 16 BRPM | HEART RATE: 60 BPM

## 2022-11-18 DIAGNOSIS — R73.01 IFG (IMPAIRED FASTING GLUCOSE): Primary | ICD-10-CM

## 2022-11-18 DIAGNOSIS — K63.5 POLYP OF COLON, UNSPECIFIED PART OF COLON, UNSPECIFIED TYPE: ICD-10-CM

## 2022-11-18 DIAGNOSIS — I10 ESSENTIAL HYPERTENSION: ICD-10-CM

## 2022-11-18 DIAGNOSIS — E78.2 MIXED HYPERLIPIDEMIA: ICD-10-CM

## 2022-11-19 NOTE — ASSESSMENT & PLAN NOTE
Chronic asymptomatic fair control continue atorvastatin 20 mg once a day low-fat diet review with the patient

## 2022-11-19 NOTE — ASSESSMENT & PLAN NOTE
Chronic asymptomatic fair control encouraged patient to continue with the low carb diet important lose weight

## 2023-01-09 ENCOUNTER — RA CDI HCC (OUTPATIENT)
Dept: OTHER | Facility: HOSPITAL | Age: 58
End: 2023-01-09

## 2023-01-30 ENCOUNTER — OFFICE VISIT (OUTPATIENT)
Dept: FAMILY MEDICINE CLINIC | Facility: CLINIC | Age: 58
End: 2023-01-30

## 2023-01-30 VITALS
DIASTOLIC BLOOD PRESSURE: 80 MMHG | HEIGHT: 71 IN | TEMPERATURE: 97 F | BODY MASS INDEX: 29.82 KG/M2 | HEART RATE: 73 BPM | OXYGEN SATURATION: 97 % | WEIGHT: 213 LBS | SYSTOLIC BLOOD PRESSURE: 120 MMHG

## 2023-01-30 DIAGNOSIS — R73.01 IFG (IMPAIRED FASTING GLUCOSE): ICD-10-CM

## 2023-01-30 DIAGNOSIS — Z00.01 ENCOUNTER FOR WELL ADULT EXAM WITH ABNORMAL FINDINGS: Primary | ICD-10-CM

## 2023-01-30 DIAGNOSIS — E23.0 HYPOGONADOTROPIC HYPOGONADISM SYNDROME, MALE (HCC): ICD-10-CM

## 2023-01-30 DIAGNOSIS — E66.3 OVERWEIGHT WITH BODY MASS INDEX (BMI) OF 29 TO 29.9 IN ADULT: ICD-10-CM

## 2023-01-30 DIAGNOSIS — E78.2 MIXED HYPERLIPIDEMIA: ICD-10-CM

## 2023-01-30 NOTE — ASSESSMENT & PLAN NOTE
BMI today 29 7 improved compared with before encourage patient to continue with the portion control low-carb diet and increase physical activity

## 2023-01-30 NOTE — ASSESSMENT & PLAN NOTE
Chronic fair control with outpatient follow-up with urology currently on testosterone gel tolerated well no side effects

## 2023-01-30 NOTE — PROGRESS NOTES
[address] [place] [address] PRIMARY CARE [facility]    NAME: Praveen Rader  AGE: 61 y o  SEX: male  : 1965     DATE: 2023     Assessment and Plan:     Problem List Items Addressed This Visit        Endocrine    Hypogonadotropic hypogonadism syndrome, male ([address] )     Chronic fair control with outpatient follow-up with urology currently on testosterone gel tolerated well no side effects         Relevant Orders    CBC and differential    Basic metabolic panel    Lipid Panel with Direct LDL reflex    PSA, Total Screen    TSH, 3rd generation with Free T4 reflex    IFG (impaired fasting glucose)     Chronic asymptomatic fair control encouraged patient to continue with a low-carb diet            Other    Hyperlipidemia     Chronic asymptomatic fair control continue atorvastatin 20 mg once a day low-fat diet reviewed         Relevant Orders    CBC and differential    Basic metabolic panel    Lipid Panel with Direct LDL reflex    PSA, Total Screen    TSH, 3rd generation with Free T4 reflex    Overweight with body mass index (BMI) of 29 to 29 9 in adult     BMI today 29 7 improved compared with before encourage patient to continue with the portion control low-carb diet and increase physical activity         Relevant Orders    CBC and differential    Basic metabolic panel    Lipid Panel with Direct LDL reflex    PSA, Total Screen    TSH, 3rd generation with Free T4 reflex    Encounter for well adult exam with abnormal findings - Primary     Advice and education were given regarding nutrition, aerobic exercises, weight-bearing exercises, cardiovascular risk reduction, fall risk reduction, and age-appropriate supplements  The patient was counseled regarding instructions for management, risk factor reductions, prognosis, risks and benefits of treatment options, patient and family education, and importance of compliance with treatment            Relevant Orders CBC and differential    Basic metabolic panel    Lipid Panel with Direct LDL reflex    PSA, Total Screen    TSH, 3rd generation with Free T4 reflex       Immunizations and preventive care screenings were discussed with patient today  Appropriate education was printed on patient's after visit summary  Discussed risks and benefits of prostate cancer screening  We discussed the controversial history of PSA screening for prostate cancer in the [place] as well as the risk of over detection and over treatment of prostate cancer by way of PSA screening  The patient understands that PSA blood testing is an imperfect way to screen for prostate cancer and that elevated PSA levels in the blood may also be caused by infection, inflammation, prostatic trauma or manipulation, urological procedures, or by benign prostatic enlargement  The role of the digital rectal examination in prostate cancer screening was also discussed and I discussed with him that there is large interobserver variability in the findings of digital rectal examination  Counseling:  Alcohol/drug use: discussed moderation in alcohol intake, the recommendations for healthy alcohol use, and avoidance of illicit drug use  Dental Health: discussed importance of regular tooth brushing, flossing, and dental visits  Injury prevention: discussed safety/seat belts, safety helmets, smoke detectors, carbon dioxide detectors, and smoking near bedding or upholstery  Sexual health: discussed sexually transmitted diseases, partner selection, use of condoms, avoidance of unintended pregnancy, and contraceptive alternatives  Exercise: the importance of regular exercise/physical activity was discussed  Recommend exercise 3-5 times per week for at least 30 minutes  BMI Counseling: Body mass index is 29 71 kg/m²   The BMI is above normal  Nutrition recommendations include decreasing portion sizes, decreasing fast food intake and limiting drinks that contain sugar  Exercise recommendations include exercising 3-5 times per week  No pharmacotherapy was ordered  Rationale for BMI follow-up plan is due to patient being overweight or obese  Depression Screening and Follow-up Plan: Patient was screened for depression during today's encounter  They screened negative with a PHQ-2 score of 0  Return in about 1 year (around 1/30/2024)  Chief Complaint:     Chief Complaint   Patient presents with   • Physical Exam   • Follow-up     Chronic conditions      History of Present Illness:     Adult Annual Physical   Patient here for a comprehensive physical exam  The patient reports F/up with chronic condition  Diet and Physical Activity  Diet/Nutrition: well balanced diet  Exercise: 3-4 times a week on average  Depression Screening  PHQ-2/9 Depression Screening    Little interest or pleasure in doing things: 0 - not at all  Feeling down, depressed, or hopeless: 0 - not at all  PHQ-2 Score: 0  PHQ-2 Interpretation: Negative depression screen       General Health  Sleep: sleeps well  Hearing: normal - bilateral   Vision: wears glasses  Dental: regular dental visits   Health  Symptoms include: none     Review of Systems:     Review of Systems   Constitutional: Negative for chills and fever  HENT: Negative for ear pain and sore throat  Eyes: Negative for pain and visual disturbance  Respiratory: Negative for cough and shortness of breath  Cardiovascular: Negative for chest pain and palpitations  Gastrointestinal: Negative for abdominal pain, blood in stool, constipation, diarrhea and vomiting  Genitourinary: Negative for dysuria and hematuria  Musculoskeletal: Negative for arthralgias and back pain  Skin: Negative for color change and rash  Neurological: Negative for seizures and syncope  Hematological: Does not bruise/bleed easily  Psychiatric/Behavioral: Negative for agitation and behavioral problems     All other systems reviewed and are negative       Past Medical History:     Past Medical History:   Diagnosis Date   • Allergic    • Diarrhea 11/29/2021   • Fever 11/29/2021   • Hyperkalemia 8/8/2016   • Hypertension    • Osteoarthritis of left knee 4/10/2020   • Situational anxiety 9/27/2018   • SOB (shortness of breath) 12/1/2021      Past Surgical History:     Past Surgical History:   Procedure Laterality Date   • ANTERIOR CRUCIATE LIGAMENT REPAIR Left    • SHOULDER SURGERY Right     R SHOULDER IMPINGEMENT SURGERY      Family History:     Family History   Problem Relation Age of Onset   • Hypertension Mother    • Hyperlipidemia Mother    • Colon cancer Father       Social History:     Social History     Socioeconomic History   • Marital status: Single     Spouse name: None   • Number of children: None   • Years of education: None   • Highest education level: None   Occupational History   • None   Tobacco Use   • Smoking status: Never   • Smokeless tobacco: Never   Vaping Use   • Vaping Use: Never used   Substance and Sexual Activity   • Alcohol use: Yes     Comment: occasional   • Drug use: No   • Sexual activity: Yes     Partners: Female   Other Topics Concern   • None   Social History Narrative    MARITAL STATUS:  AS PER [employer]    HAS CHILDREN    HAND DOMINANCE: RIGHT     Social Determinants of Health     Financial Resource Strain: Not on file   Food Insecurity: Not on file   Transportation Needs: Not on file   Physical Activity: Not on file   Stress: Not on file   Social Connections: Not on file   Intimate Partner Violence: Not on file   Housing Stability: Not on file      Current Medications:     Current Outpatient Medications   Medication Sig Dispense Refill   • atorvastatin (LIPITOR) 20 mg tablet TAKE 1 TABLET DAILY 90 tablet 1   • Multiple Vitamin (MULTI-VITAMIN DAILY PO) Take 1 capsule by mouth     • Testosterone 12 5 MG/ACT (1%) GEL 2 pumps daily       No current facility-administered medications for this visit  Allergies: Allergies   Allergen Reactions   • No Known Allergies       Physical Exam:     /80 (BP Location: Left arm, Patient Position: Sitting)   Pulse 73   Temp (!) 97 °F (36 1 °C) (Tympanic)   Ht 5' 11" (1 803 m)   Wt 96 6 kg (213 lb)   SpO2 97%   BMI 29 71 kg/m²     Physical Exam  Vitals and nursing note reviewed  Constitutional:       General: He is not in acute distress  Appearance: He is well-developed  HENT:      Head: Normocephalic and atraumatic  Right Ear: Tympanic membrane normal       Left Ear: Tympanic membrane normal       Nose: No congestion  Eyes:      Conjunctiva/sclera: Conjunctivae normal    Cardiovascular:      Rate and Rhythm: Normal rate and regular rhythm  Heart sounds: No murmur heard  Pulmonary:      Effort: Pulmonary effort is normal  No respiratory distress  Breath sounds: Normal breath sounds  Abdominal:      Palpations: Abdomen is soft  Tenderness: There is no abdominal tenderness  Musculoskeletal:         General: No swelling  Cervical back: Neck supple  Skin:     General: Skin is warm and dry  Capillary Refill: Capillary refill takes less than 2 seconds  Neurological:      Mental Status: He is alert and oriented to person, place, and time     Psychiatric:         Mood and Affect: Mood normal           Wen Lebron MD  [address] [place] [address]

## 2023-05-10 DIAGNOSIS — E78.2 MIXED HYPERLIPIDEMIA: ICD-10-CM

## 2023-05-10 RX ORDER — ATORVASTATIN CALCIUM 20 MG/1
TABLET, FILM COATED ORAL
Qty: 90 TABLET | Refills: 3 | Status: SHIPPED | OUTPATIENT
Start: 2023-05-10

## 2023-07-07 ENCOUNTER — OFFICE VISIT (OUTPATIENT)
Dept: OBGYN CLINIC | Facility: MEDICAL CENTER | Age: 58
End: 2023-07-07
Payer: COMMERCIAL

## 2023-07-07 VITALS
WEIGHT: 213.2 LBS | DIASTOLIC BLOOD PRESSURE: 78 MMHG | HEIGHT: 71 IN | SYSTOLIC BLOOD PRESSURE: 131 MMHG | BODY MASS INDEX: 29.85 KG/M2 | HEART RATE: 74 BPM

## 2023-07-07 DIAGNOSIS — G89.29 CHRONIC PAIN OF LEFT KNEE: ICD-10-CM

## 2023-07-07 DIAGNOSIS — M25.562 CHRONIC PAIN OF LEFT KNEE: ICD-10-CM

## 2023-07-07 DIAGNOSIS — M17.12 PRIMARY OSTEOARTHRITIS OF LEFT KNEE: Primary | ICD-10-CM

## 2023-07-07 PROCEDURE — 20610 DRAIN/INJ JOINT/BURSA W/O US: CPT | Performed by: PHYSICIAN ASSISTANT

## 2023-07-07 PROCEDURE — 99213 OFFICE O/P EST LOW 20 MIN: CPT | Performed by: PHYSICIAN ASSISTANT

## 2023-07-07 RX ORDER — BUPIVACAINE HYDROCHLORIDE 2.5 MG/ML
2 INJECTION, SOLUTION INFILTRATION; PERINEURAL
Status: COMPLETED | OUTPATIENT
Start: 2023-07-07 | End: 2023-07-07

## 2023-07-07 RX ORDER — TRIAMCINOLONE ACETONIDE 40 MG/ML
40 INJECTION, SUSPENSION INTRA-ARTICULAR; INTRAMUSCULAR
Status: COMPLETED | OUTPATIENT
Start: 2023-07-07 | End: 2023-07-07

## 2023-07-07 RX ADMIN — TRIAMCINOLONE ACETONIDE 40 MG: 40 INJECTION, SUSPENSION INTRA-ARTICULAR; INTRAMUSCULAR at 14:45

## 2023-07-07 RX ADMIN — BUPIVACAINE HYDROCHLORIDE 2 ML: 2.5 INJECTION, SOLUTION INFILTRATION; PERINEURAL at 14:45

## 2023-07-07 NOTE — PROGRESS NOTES
Assessment/Plan:  1. Primary osteoarthritis of left knee    2. Chronic pain of left knee      Orders Placed This Encounter   Procedures   • Large joint arthrocentesis       · Patient has severe left knee osteoarthritis. · Patient received left knee steroid injection. Tolerated the procedure well. Post injection instructions reviewed. · May take OTC NSAID as needed. · Continue activity as tolerated. · Briefly discussed TKA in the future. Patient is a surgical candidate but continues to get adequate relief from steroid injections. Return in about 3 months (around 10/7/2023). I answered all of the patient's questions during the visit and provided education of the patient's condition during the visit. The patient verbalized understanding of the information given and agrees with the plan. This note was dictated using [employer] software. It may contain errors including improperly dictated words. Please contact physician directly for any questions. Subjective   Chief Complaint:   Chief Complaint   Patient presents with   • Left Knee - Follow-up       HPI  Carlo Baez is a 60 y.o. male who presents for follow up for left knee pain. Patient received left knee steroid injection at his last visit on 5/13/21 and reports pain relief for one year. Patient notes the return of left knee pain. Pain is located medially and described as constant. Pain is worse with weight bearing. He is not taking anything for pain. He will occasionally take tylenol. Patient is not wearing a knee brace or doing PT. He is considering TKA in the future but would like a steroid injection today. Review of Systems  ROS:    See HPI for musculoskeletal review.    All other systems reviewed are negative     History:  Past Medical History:   Diagnosis Date   • Allergic    • Diarrhea 11/29/2021   • Fever 11/29/2021   • Hyperkalemia 8/8/2016   • Hypertension    • Osteoarthritis of left knee 4/10/2020   • Situational anxiety 9/27/2018 • SOB (shortness of breath) 12/1/2021     Past Surgical History:   Procedure Laterality Date   • ANTERIOR CRUCIATE LIGAMENT REPAIR Left    • SHOULDER SURGERY Right     R SHOULDER IMPINGEMENT SURGERY     Social History   Social History     Substance and Sexual Activity   Alcohol Use Yes    Comment: occasional     Social History     Substance and Sexual Activity   Drug Use No     Social History     Tobacco Use   Smoking Status Never   Smokeless Tobacco Never     Family History:   Family History   Problem Relation Age of Onset   • Hypertension Mother    • Hyperlipidemia Mother    • Colon cancer Father        Current Outpatient Medications on File Prior to Visit   Medication Sig Dispense Refill   • atorvastatin (LIPITOR) 20 mg tablet TAKE 1 TABLET DAILY 90 tablet 3   • Multiple Vitamin (MULTI-VITAMIN DAILY PO) Take 1 capsule by mouth     • Testosterone 12.5 MG/ACT (1%) GEL 2 pumps daily       No current facility-administered medications on file prior to visit. Allergies   Allergen Reactions   • No Known Allergies         Objective     /78   Pulse 74   Ht 5' 11" (1.803 m)   Wt 96.7 kg (213 lb 3.2 oz)   BMI 29.74 kg/m²      PE:  AAOx 3  WDWN  Hearing intact, no drainage from eyes  no audible wheezing  no abdominal distension  LE compartments soft, skin intact    Ortho Exam:  left Knee:   No erythema  no swelling  no effusion  no warmth  +TTP over medial joint line  AROM: 0- 115  Stable to varus/valgus stress      Large joint arthrocentesis: L knee  Universal Protocol:  Consent: Verbal consent obtained.   Risks and benefits: risks, benefits and alternatives were discussed  Consent given by: patient  Site marked: the operative site was marked  Supporting Documentation  Indications: pain   Procedure Details  Location: knee - L knee  Preparation: Patient was prepped and draped in the usual sterile fashion  Needle size: 22 G  Ultrasound guidance: no  Approach: anterolateral  Medications administered: 2 mL bupivacaine 0.25 %; 40 mg triamcinolone acetonide 40 mg/mL    Patient tolerance: patient tolerated the procedure well with no immediate complications  Dressing:  Sterile dressing applied            Scribe Attestation    I,:  Trudy Aldana PA-C am acting as a scribe while in the presence of the attending physician.:       I,:  Neli William DO personally performed the services described in this documentation    as scribed in my presence.:

## 2023-07-28 ENCOUNTER — OFFICE VISIT (OUTPATIENT)
Dept: FAMILY MEDICINE CLINIC | Facility: CLINIC | Age: 58
End: 2023-07-28
Payer: COMMERCIAL

## 2023-07-28 VITALS
SYSTOLIC BLOOD PRESSURE: 132 MMHG | HEART RATE: 72 BPM | WEIGHT: 211 LBS | TEMPERATURE: 96.3 F | HEIGHT: 71 IN | DIASTOLIC BLOOD PRESSURE: 88 MMHG | OXYGEN SATURATION: 97 % | BODY MASS INDEX: 29.54 KG/M2

## 2023-07-28 DIAGNOSIS — E23.0 HYPOGONADOTROPIC HYPOGONADISM SYNDROME, MALE (HCC): ICD-10-CM

## 2023-07-28 DIAGNOSIS — E78.2 MIXED HYPERLIPIDEMIA: ICD-10-CM

## 2023-07-28 DIAGNOSIS — R73.01 IFG (IMPAIRED FASTING GLUCOSE): Primary | ICD-10-CM

## 2023-07-28 PROCEDURE — 99214 OFFICE O/P EST MOD 30 MIN: CPT | Performed by: FAMILY MEDICINE

## 2023-07-28 NOTE — PROGRESS NOTES
Name: Shad Castaneda      : 1965      MRN: [id number]  Encounter Provider: Ryan Lee MD  Encounter Date: 2023   Encounter department: 1 [facility]     1. IFG (impaired fasting glucose)  Assessment & Plan:  Chronic asymptomatic continue low carb diet patient will be due for blood work      2. Mixed hyperlipidemia  Assessment & Plan:  Chronic asymptomatic patient will continue with atorvastatin 20 mg once a day patient already had a blood work order in the system he will go with her this coming week well called for results      3. Hypogonadotropic hypogonadism syndrome, male [facility])  Assessment & Plan:  Chronic asymptomatic patient on testosterone gel tolerated well without side effect             Subjective      Patient here follow-up with a chronic condition compliant with the medication tolerated well without side effect no new concerns no recent blood work    Review of Systems   Constitutional: Negative for chills and fever. HENT: Negative for ear pain and sore throat. Eyes: Negative for pain and visual disturbance. Respiratory: Negative for cough and shortness of breath. Cardiovascular: Negative for chest pain and palpitations. Gastrointestinal: Negative for abdominal pain, constipation, diarrhea and vomiting. Genitourinary: Negative for dysuria and hematuria. Musculoskeletal: Negative for arthralgias and back pain. Skin: Negative for color change and rash. Neurological: Negative for seizures and syncope. All other systems reviewed and are negative.       Current Outpatient Medications on File Prior to Visit   Medication Sig   • atorvastatin (LIPITOR) 20 mg tablet TAKE 1 TABLET DAILY   • Multiple Vitamin (MULTI-VITAMIN DAILY PO) Take 1 capsule by mouth   • Testosterone 12.5 MG/ACT (1%) GEL 2 pumps daily       Objective     /88 (BP Location: Left arm, Patient Position: Sitting, Cuff Size: Standard)   Pulse 72   Temp Lucas Jameson ) 96.3 °F (35.7 °C) (Tympanic)   Ht 5' 11" (1.803 m)   Wt 95.7 kg (211 lb)   SpO2 97%   BMI 29.43 kg/m²     Physical Exam  Vitals and nursing note reviewed. Constitutional:       General: He is not in acute distress. Appearance: He is well-developed. He is not diaphoretic. HENT:      Head: Normocephalic. Right Ear: External ear normal.      Left Ear: External ear normal.      Mouth/Throat:      Pharynx: No posterior oropharyngeal erythema. Eyes:      General:         Right eye: No discharge. Left eye: No discharge. Conjunctiva/sclera: Conjunctivae normal.   Neck:      Vascular: No JVD. Cardiovascular:      Rate and Rhythm: Normal rate and regular rhythm. Heart sounds: Normal heart sounds. No murmur heard. No gallop. Pulmonary:      Effort: Pulmonary effort is normal. No respiratory distress. Breath sounds: Normal breath sounds. No stridor. No wheezing or rales. Chest:      Chest wall: No tenderness. Abdominal:      General: There is no distension. Palpations: Abdomen is soft. There is no mass. Tenderness: There is no abdominal tenderness. There is no rebound. Musculoskeletal:         General: No tenderness. Cervical back: Normal range of motion and neck supple. Lymphadenopathy:      Cervical: No cervical adenopathy. Skin:     General: Skin is warm. Findings: No erythema or rash. Neurological:      Mental Status: He is alert and oriented to person, place, and time.        Natasha Sandra MD

## 2023-07-28 NOTE — ASSESSMENT & PLAN NOTE
Chronic asymptomatic patient will continue with atorvastatin 20 mg once a day patient already had a blood work order in the system he will go with her this coming week well called for results

## 2023-11-09 ENCOUNTER — OFFICE VISIT (OUTPATIENT)
Dept: OBGYN CLINIC | Facility: CLINIC | Age: 58
End: 2023-11-09
Payer: COMMERCIAL

## 2023-11-09 VITALS
HEIGHT: 71 IN | WEIGHT: 216.7 LBS | BODY MASS INDEX: 30.34 KG/M2 | DIASTOLIC BLOOD PRESSURE: 82 MMHG | SYSTOLIC BLOOD PRESSURE: 122 MMHG | HEART RATE: 74 BPM

## 2023-11-09 DIAGNOSIS — M17.12 PRIMARY OSTEOARTHRITIS OF LEFT KNEE: Primary | ICD-10-CM

## 2023-11-09 PROCEDURE — 99212 OFFICE O/P EST SF 10 MIN: CPT | Performed by: ORTHOPAEDIC SURGERY

## 2023-12-11 ENCOUNTER — OFFICE VISIT (OUTPATIENT)
Dept: FAMILY MEDICINE CLINIC | Facility: CLINIC | Age: 58
End: 2023-12-11
Payer: COMMERCIAL

## 2023-12-11 VITALS
HEART RATE: 64 BPM | HEIGHT: 71 IN | OXYGEN SATURATION: 95 % | SYSTOLIC BLOOD PRESSURE: 100 MMHG | WEIGHT: 218 LBS | BODY MASS INDEX: 30.52 KG/M2 | DIASTOLIC BLOOD PRESSURE: 80 MMHG | TEMPERATURE: 97.5 F
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
## 2023-12-11 DIAGNOSIS — J34.89 NOSE PAIN: ICD-10-CM

## 2023-12-11 DIAGNOSIS — R55 SYNCOPE, UNSPECIFIED SYNCOPE TYPE: Primary | ICD-10-CM

## 2023-12-11 PROCEDURE — 93000 ELECTROCARDIOGRAM COMPLETE: CPT | Performed by: FAMILY MEDICINE

## 2023-12-11 PROCEDURE — 99215 OFFICE O/P EST HI 40 MIN: CPT | Performed by: FAMILY MEDICINE

## 2023-12-11 NOTE — ASSESSMENT & PLAN NOTE
New diagnosis recurrent events patient was conscious completely lose control of the urine or stool the first episode was 2021 after chordee the second episode lasted couple months ago last 1 happened 3 weeks ago EKG in the office showed normal sinus rhythm and neurological exam is normal no history of seizure recommend workup plan for echocardiogram Holter monitor to rule out valvular disease versus arrhythmia plan for MRI of the brain and check blood work to rule out electrolyte imbalance and check sugar level in case symptoms get worse to go to the emergency room with the results accordingly

## 2023-12-11 NOTE — PROGRESS NOTES
Name: Catarino Carmichael      : 1965      MRN: [id number]  Encounter Provider: Eliecer Go MD  Encounter Date: 2023   Encounter department: [address] [place] [address]     1. Syncope, unspecified syncope type  Assessment & Plan:  New diagnosis recurrent events patient was conscious completely lose control of the urine or stool the first episode was  after chordee the second episode lasted couple months ago last 1 happened 3 weeks ago EKG in the office showed normal sinus rhythm and neurological exam is normal no history of seizure recommend workup plan for echocardiogram Holter monitor to rule out valvular disease versus arrhythmia plan for MRI of the brain and check blood work to rule out electrolyte imbalance and check sugar level in case symptoms get worse to go to the emergency room with the results accordingly    Orders:  -     CBC and differential; Future  -     Comprehensive metabolic panel; Future  -     TSH, 3rd generation with Free T4 reflex; Future  -     Magnesium; Future  -     MRI brain wo contrast; Future; Expected date: 2023  -     Echo complete w/ contrast if indicated; Future; Expected date: 2023  -     Holter monitor; Future; Expected date: 2023  -     POCT ECG    2. Fall, initial encounter  Assessment & Plan:  New diagnosis happened 3 weeks ago fell and hit the floor patient had bleeding from his nose when he wake up for precaution reviewed with the patient    Orders:  -     XR nasal bones; Future; Expected date: 2023  -     MRI brain wo contrast; Future; Expected date: 2023  -     Echo complete w/ contrast if indicated; Future; Expected date: 2023  -     Holter monitor; Future; Expected date: 2023    3.  Nose pain  Assessment & Plan:  New diagnosis status post fall with concern for x-ray of the nose to rule out fracture recommend Tylenol for the pain          Depression Screening and Follow-up Plan: Patient was screened for depression during today's encounter. They screened negative with a PHQ-2 score of 0. Subjective      Patient here in the office concerned about the passing out he had this episode 3 weeks ago he was laying dysaesthetic in the hotel room when he wake up to 1 minute he can get to the bathroom for urination and have to urinate that he passed out on the floor he wake up, then later he had about the color of his nose before he passed out he felt lightheaded and dizzy no vomiting denies any jerking movements and denies any loss control of the urine or stool when he wake up recommend to take per patient this is a start time may have 1 similar episode couple months ago and the first episode was in 2021 after he diagnosed with COVID patient also concerned about the pain in his nose status still positive for      Review of Systems   Constitutional:  Negative for chills and fever. HENT:  Negative for ear pain and sore throat. Nose pain   Eyes:  Negative for pain and visual disturbance. Respiratory:  Negative for cough and shortness of breath. Cardiovascular:  Negative for chest pain and palpitations. Gastrointestinal:  Negative for abdominal pain, constipation, diarrhea and vomiting. Genitourinary:  Negative for dysuria and hematuria. Musculoskeletal:  Negative for arthralgias and back pain. Skin:  Negative for color change and rash. Neurological:  Positive for syncope. Negative for tremors, seizures, facial asymmetry, speech difficulty, weakness, numbness and headaches. All other systems reviewed and are negative.       Current Outpatient Medications on File Prior to Visit   Medication Sig   • atorvastatin (LIPITOR) 20 mg tablet TAKE 1 TABLET DAILY   • Multiple Vitamin (MULTI-VITAMIN DAILY PO) Take 1 capsule by mouth   • Testosterone 12.5 MG/ACT (1%) GEL 2 pumps daily       Objective     /80 (BP Location: Left arm, Patient Position: Sitting)   Pulse 64   Temp 97.5 °F (36.4 °C) (Tympanic)   Ht 5' 11" (1.803 m)   Wt 98.9 kg (218 lb)   SpO2 95%   BMI 30.40 kg/m²     Physical Exam  Vitals and nursing note reviewed. Constitutional:       General: He is not in acute distress. Appearance: Normal appearance. He is well-developed. He is not diaphoretic. HENT:      Head: Normocephalic. Right Ear: Tympanic membrane, ear canal and external ear normal.      Left Ear: Tympanic membrane, ear canal and external ear normal.      Nose: Nose normal. No congestion or rhinorrhea. Mouth/Throat:      Mouth: Mucous membranes are moist.      Pharynx: Oropharynx is clear. No oropharyngeal exudate or posterior oropharyngeal erythema. Eyes:      General:         Right eye: No discharge. Left eye: No discharge. Conjunctiva/sclera: Conjunctivae normal.   Neck:      Vascular: No JVD. Cardiovascular:      Rate and Rhythm: Normal rate and regular rhythm. Heart sounds: Normal heart sounds. No murmur heard. No gallop. Pulmonary:      Effort: Pulmonary effort is normal. No respiratory distress. Breath sounds: Normal breath sounds. No stridor. No wheezing or rales. Chest:      Chest wall: No tenderness. Abdominal:      General: There is no distension. Palpations: Abdomen is soft. There is no mass. Tenderness: There is no abdominal tenderness. There is no rebound. Musculoskeletal:         General: No tenderness. Normal range of motion. Cervical back: Normal range of motion and neck supple. Lymphadenopathy:      Cervical: No cervical adenopathy. Skin:     General: Skin is warm. Findings: No erythema or rash. Neurological:      Mental Status: He is alert and oriented to person, place, and time. Cranial Nerves: No cranial nerve deficit. Sensory: No sensory deficit. Motor: No weakness.       Coordination: Coordination normal.      Gait: Gait normal.      Deep Tendon Reflexes: Reflexes normal.       Maha Saravanan Han MD

## 2023-12-11 NOTE — ASSESSMENT & PLAN NOTE
New diagnosis happened 3 weeks ago fell and hit the floor patient had bleeding from his nose when he wake up for precaution reviewed with the patient

## 2023-12-11 NOTE — ASSESSMENT & PLAN NOTE
New diagnosis status post fall with concern for x-ray of the nose to rule out fracture recommend Tylenol for the pain

## 2023-12-20 ENCOUNTER — TELEPHONE (OUTPATIENT)
Dept: FAMILY MEDICINE CLINIC | Facility: CLINIC | Age: 58
End: 2023-12-20

## 2023-12-20 ENCOUNTER — HOSPITAL ENCOUNTER (OUTPATIENT)
Dept: RADIOLOGY | Facility: HOSPITAL | Age: 58
Discharge: HOME | End: 2023-12-20
Payer: COMMERCIAL
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
## 2023-12-20 DIAGNOSIS — R55 SYNCOPE, UNSPECIFIED SYNCOPE TYPE: ICD-10-CM

## 2023-12-20 DIAGNOSIS — S02.2XXA CLOSED DISPLACED FRACTURE OF NASAL BONE, INITIAL ENCOUNTER: Primary | ICD-10-CM

## 2023-12-20 PROCEDURE — 70160 X-RAY EXAM OF NASAL BONES: CPT

## 2023-12-20 PROCEDURE — 70551 MRI BRAIN STEM W/O DYE: CPT

## 2023-12-20 PROCEDURE — G1004 CDSM NDSC: HCPCS

## 2023-12-20 NOTE — TELEPHONE ENCOUNTER
Incoming call from [facility] called to notify you that there are Immediate findings found on xray of nasal bones ready for your review

## 2023-12-20 NOTE — TELEPHONE ENCOUNTER
----- Message from Antoine Llanes MD sent at 12/20/2023 10:38 AM EST -----  Minimally displaced distal nasal bone fracture  Refer patient to plastic surgeon

## 2023-12-20 NOTE — TELEPHONE ENCOUNTER
Notified patient of results and referral entered for plastic surgery patient will call to schedule

## 2023-12-27 ENCOUNTER — TELEPHONE (OUTPATIENT)
Dept: FAMILY MEDICINE CLINIC | Facility: CLINIC | Age: 58
End: 2023-12-27

## 2023-12-29 ENCOUNTER — HOSPITAL ENCOUNTER (OUTPATIENT)
Dept: NON INVASIVE DIAGNOSTICS | Facility: HOSPITAL | Age: 58
Discharge: HOME | End: 2023-12-29
Payer: COMMERCIAL

## 2023-12-29 VITALS
SYSTOLIC BLOOD PRESSURE: 100 MMHG | HEART RATE: 63 BPM | HEIGHT: 71 IN | DIASTOLIC BLOOD PRESSURE: 80 MMHG | BODY MASS INDEX: 30.52 KG/M2 | WEIGHT: 218 LBS
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
## 2023-12-29 DIAGNOSIS — R55 SYNCOPE, UNSPECIFIED SYNCOPE TYPE: ICD-10-CM

## 2023-12-29 LAB
AORTIC ROOT: 3.2 CM
APICAL FOUR CHAMBER EJECTION FRACTION: 61 %
ASCENDING AORTA: 3.4 CM
E WAVE DECELERATION TIME: 213 MS
E/A RATIO: 1.59
FRACTIONAL SHORTENING: 33 (ref 28–44)
INTERVENTRICULAR SEPTUM IN DIASTOLE (PARASTERNAL SHORT AXIS VIEW): 1.1 CM
INTERVENTRICULAR SEPTUM: 1.1 CM (ref 0.6–1.1)
LAAS-AP2: 19.3 CM2
LAAS-AP4: 20.8 CM2
LEFT ATRIUM SIZE: 3.4 CM
LEFT ATRIUM VOLUME (MOD BIPLANE): 59 ML
LEFT ATRIUM VOLUME INDEX (MOD BIPLANE): 26.9 ML/M2
LEFT INTERNAL DIMENSION IN SYSTOLE: 3 CM (ref 2.1–4)
LEFT VENTRICULAR INTERNAL DIMENSION IN DIASTOLE: 4.5 CM (ref 3.5–6)
LEFT VENTRICULAR POSTERIOR WALL IN END DIASTOLE: 1 CM
LEFT VENTRICULAR STROKE VOLUME: 60 ML
LVSV (TEICH): 60 ML
MV E'TISSUE VEL-SEP: 13 CM/S
MV PEAK A VEL: 0.58 M/S
MV PEAK E VEL: 92 CM/S
MV STENOSIS PRESSURE HALF TIME: 62 MS
MV VALVE AREA P 1/2 METHOD: 3.55
RA PRESSURE ESTIMATED: 3 MMHG
RIGHT ATRIUM AREA SYSTOLE A4C: 17.1 CM2
RIGHT VENTRICLE ID DIMENSION: 3.6 CM
SL CV LEFT ATRIUM LENGTH A2C: 5.4 CM
SL CV LV EF: 60
SL CV PED ECHO LEFT VENTRICLE DIASTOLIC VOLUME (MOD BIPLANE) 2D: 94 ML
SL CV PED ECHO LEFT VENTRICLE SYSTOLIC VOLUME (MOD BIPLANE) 2D: 35 ML
TRICUSPID ANNULAR PLANE SYSTOLIC EXCURSION: 2.2 CM

## 2023-12-29 PROCEDURE — 93225 XTRNL ECG REC<48 HRS REC: CPT

## 2023-12-29 PROCEDURE — 93226 XTRNL ECG REC<48 HR SCAN A/R: CPT

## 2023-12-29 PROCEDURE — 93306 TTE W/DOPPLER COMPLETE: CPT

## 2023-12-29 PROCEDURE — 93306 TTE W/DOPPLER COMPLETE: CPT | Performed by: INTERNAL MEDICINE

## 2024-01-03 PROCEDURE — 93227 XTRNL ECG REC<48 HR R&I: CPT | Performed by: INTERNAL MEDICINE

## 2024-01-04 ENCOUNTER — TELEPHONE (OUTPATIENT)
Dept: FAMILY MEDICINE CLINIC | Facility: CLINIC | Age: 59
End: 2024-01-04

## 2024-01-04 DIAGNOSIS — R55 SYNCOPE, UNSPECIFIED SYNCOPE TYPE: Primary | ICD-10-CM

## 2024-01-04 NOTE — TELEPHONE ENCOUNTER
----- Message from Antoine Llanes MD sent at 1/4/2024 10:27 AM EST -----  Recommend to be seen by Cardiology for Syncope

## 2024-01-10 ENCOUNTER — CONSULT (OUTPATIENT)
Dept: PLASTIC SURGERY | Facility: CLINIC | Age: 59
End: 2024-01-10
Payer: COMMERCIAL

## 2024-01-10 VITALS
DIASTOLIC BLOOD PRESSURE: 92 MMHG | TEMPERATURE: 98.9 F | BODY MASS INDEX: 30.1 KG/M2 | SYSTOLIC BLOOD PRESSURE: 129 MMHG | HEART RATE: 64 BPM | HEIGHT: 71 IN | WEIGHT: 215 LBS

## 2024-01-10 DIAGNOSIS — S02.2XXA CLOSED DISPLACED FRACTURE OF NASAL BONE, INITIAL ENCOUNTER: ICD-10-CM

## 2024-01-10 PROCEDURE — 99244 OFF/OP CNSLTJ NEW/EST MOD 40: CPT | Performed by: SURGERY

## 2024-01-10 NOTE — PROGRESS NOTES
"Assessment/Plan: Please see HPI.  I reviewed the films with Masoud, this is a clinically insignificant minimally displaced distal nasal fracture, requiring no treatment.  He understands, his questions have been answered to his satisfaction         Diagnoses and all orders for this visit:    Closed displaced fracture of nasal bone, initial encounter  -     Ambulatory Referral to Plastic Surgery          Subjective: Nasal fracture     Patient ID: Masoud Pina is a 58 y.o. male.    HPI Masoud is a pleasant 58-year-old male, who presents today on referral from Dr. Llanes regarding a nasal fracture.  He reports that while in [place] on a business in early December he sustained a fall, striking his face on the bathroom floor.  He reports significant bleeding at the time, and presented perhaps 1 week later for radiographs.  X-rays have been read as a minimally displaced distal nasal fracture.  There is no obvious deformity on examination, and he reports no issues related to this injury other than some residual \"soreness\".    The following portions of the patient's history were reviewed and updated as appropriate: allergies, current medications, past family history, past medical history, past social history, past surgical history, and problem list.    Review of Systems   Constitutional:  Negative for chills and fever.   HENT:  Negative for hearing loss.    Eyes:  Positive for visual disturbance. Negative for discharge.   Respiratory:  Negative for chest tightness and shortness of breath.    Cardiovascular:  Negative for chest pain and leg swelling.   Gastrointestinal:  Negative for blood in stool, constipation, diarrhea and nausea.   Genitourinary:  Negative for dysuria.   Musculoskeletal:  Negative for gait problem.   Skin:  Negative for rash.   Allergic/Immunologic: Negative for immunocompromised state.   Neurological:  Negative for seizures and headaches.   Hematological:  Does not bruise/bleed easily. " "  Psychiatric/Behavioral:  Negative for dysphoric mood. The patient is not nervous/anxious.        Objective:      /92   Pulse 64   Temp 98.9 °F (37.2 °C)   Ht 5' 11\" (1.803 m)   Wt 97.5 kg (215 lb)   BMI 29.99 kg/m²          Physical Exam  Constitutional:       Appearance: Normal appearance.   HENT:      Head: Normocephalic and atraumatic.      Nose: Nose normal.   Eyes:      Extraocular Movements: Extraocular movements intact.      Pupils: Pupils are equal, round, and reactive to light.   Cardiovascular:      Rate and Rhythm: Normal rate.   Pulmonary:      Effort: Pulmonary effort is normal.   Abdominal:      Palpations: Abdomen is soft.   Musculoskeletal:         General: Normal range of motion.      Cervical back: Normal range of motion and neck supple.   Skin:     General: Skin is warm.   Neurological:      General: No focal deficit present.      Mental Status: He is alert.   Psychiatric:         Behavior: Behavior normal.         "

## 2024-02-01 ENCOUNTER — RA CDI HCC (OUTPATIENT)
Dept: OTHER | Facility: HOSPITAL | Age: 59
End: 2024-02-01

## 2024-02-02 ENCOUNTER — OFFICE VISIT (OUTPATIENT)
Dept: FAMILY MEDICINE CLINIC | Facility: CLINIC | Age: 59
End: 2024-02-02
Payer: COMMERCIAL

## 2024-02-02 VITALS
WEIGHT: 216.8 LBS | TEMPERATURE: 97.1 F | DIASTOLIC BLOOD PRESSURE: 80 MMHG | SYSTOLIC BLOOD PRESSURE: 112 MMHG | OXYGEN SATURATION: 97 % | HEART RATE: 68 BPM | HEIGHT: 71 IN | BODY MASS INDEX: 30.35 KG/M2

## 2024-02-02 DIAGNOSIS — E78.2 MIXED HYPERLIPIDEMIA: ICD-10-CM

## 2024-02-02 DIAGNOSIS — Z13.29 SCREENING FOR THYROID DISORDER: ICD-10-CM

## 2024-02-02 DIAGNOSIS — E23.0 HYPOGONADOTROPIC HYPOGONADISM SYNDROME, MALE (HCC): ICD-10-CM

## 2024-02-02 DIAGNOSIS — Z12.5 SCREENING FOR PROSTATE CANCER: ICD-10-CM

## 2024-02-02 DIAGNOSIS — Z00.01 ENCOUNTER FOR WELL ADULT EXAM WITH ABNORMAL FINDINGS: Primary | ICD-10-CM

## 2024-02-02 DIAGNOSIS — E66.9 OBESITY (BMI 30-39.9): ICD-10-CM

## 2024-02-02 DIAGNOSIS — Z13.228 SCREENING FOR METABOLIC DISORDER: ICD-10-CM

## 2024-02-02 DIAGNOSIS — R10.13 DYSPEPSIA: ICD-10-CM

## 2024-02-02 PROCEDURE — 99396 PREV VISIT EST AGE 40-64: CPT | Performed by: FAMILY MEDICINE

## 2024-02-02 PROCEDURE — 99214 OFFICE O/P EST MOD 30 MIN: CPT | Performed by: FAMILY MEDICINE

## 2024-02-02 NOTE — PROGRESS NOTES
ADULT ANNUAL PHYSICAL  [facility] PRIMARY CARE [facility]    NAME: Masoud Pina  AGE: 58 y.o. SEX: male  : 1965     DATE: 2024     Assessment and Plan:     Problem List Items Addressed This Visit     Hyperlipidemia     Chronic asymptomatic fair control blood work from 2023 reviewed with the patient patient currently has not been taking his medication recommend to restart atorvastatin 20 mg once a day low-fat diet discussed with the patient         Relevant Orders    CBC and differential    Comprehensive metabolic panel    Lipid Panel with Direct LDL reflex    Hypogonadotropic hypogonadism syndrome, male (HCC)     Patient follow-up with the urology currently on testosterone gel per urology will defer the treatment to us and patient asked if we continue to care for this condition and we will check testosterone levels free and total with the next blood work         Relevant Orders    CBC and differential    Comprehensive metabolic panel    Testosterone    Obesity (BMI 30-39.9)     BMI today 30.24 discussed portion control low-carb diet increase physically         Relevant Orders    CBC and differential    Comprehensive metabolic panel    Dyspepsia     Chronic ,well controlled   Anti -reflex measures :  · Raise the head of the bed 4 to 6 inches.   · Avoid smoking, alcohol  · Avoid excess coffee, tea or other caffeinated beverages.   · Avoid garments that fit tightly through the abdomen.   · Avoid eating before bed.   · Weight loss is beneficial.         Relevant Orders    CBC and differential    Comprehensive metabolic panel    Encounter for well adult exam with abnormal findings - Primary     Advice and education were given regarding nutrition, aerobic exercises, weight-bearing exercises, cardiovascular risk reduction, fall risk reduction, and age-appropriate supplements.     The patient was counseled regarding instructions for management, risk  factor reductions, prognosis, risks and benefits of treatment options, patient and family education, and importance of compliance with treatment.         Relevant Orders    CBC and differential    Comprehensive metabolic panel   Other Visit Diagnoses     Screening for prostate cancer        Relevant Orders    PSA Total, Diagnostic    Screening for thyroid disorder        Relevant Orders    TSH, 3rd generation with Free T4 reflex    Screening for metabolic disorder        Relevant Orders    Comprehensive metabolic panel            Immunizations and preventive care screenings were discussed with patient today. Appropriate education was printed on patient's after visit summary.    Discussed risks and benefits of prostate cancer screening. We discussed the controversial history of PSA screening for prostate cancer in the [place] as well as the risk of over detection and over treatment of prostate cancer by way of PSA screening.  The patient understands that PSA blood testing is an imperfect way to screen for prostate cancer and that elevated PSA levels in the blood may also be caused by infection, inflammation, prostatic trauma or manipulation, urological procedures, or by benign prostatic enlargement.    The role of the digital rectal examination in prostate cancer screening was also discussed and I discussed with him that there is large interobserver variability in the findings of digital rectal examination.    Counseling:  Alcohol/drug use: discussed moderation in alcohol intake, the recommendations for healthy alcohol use, and avoidance of illicit drug use.  Dental Health: discussed importance of regular tooth brushing, flossing, and dental visits.  Injury prevention: discussed safety/seat belts, safety helmets, smoke detectors, carbon dioxide detectors, and smoking near bedding or upholstery.  Sexual health: discussed sexually transmitted diseases, partner selection, use of condoms, avoidance of unintended  pregnancy, and contraceptive alternatives.  Exercise: the importance of regular exercise/physical activity was discussed. Recommend exercise 3-5 times per week for at least 30 minutes.       Depression Screening and Follow-up Plan: Patient was screened for depression during today's encounter. They screened negative with a PHQ-2 score of 0.        Return in about 1 year (around 2/2/2025).     Chief Complaint:     Chief Complaint   Patient presents with   • Physical Exam      History of Present Illness:     Adult Annual Physical   Patient here for a comprehensive physical exam. The patient reports  and follow-up with a chronic condition patient has not been taking his statin he denies any side effect or intolerance he felt that he does not need it recent blood work and workup for syncope including echocardiogram MRI of the brain and Holter monitor reviewed with the patient .    Diet and Physical Activity  Diet/Nutrition:  No special diet .   Exercise: no formal exercise.      Depression Screening  PHQ-2/9 Depression Screening    Little interest or pleasure in doing things: 0 - not at all  Feeling down, depressed, or hopeless: 0 - not at all  PHQ-2 Score: 0  PHQ-2 Interpretation: Negative depression screen       General Health  Sleep: sleeps well.   Hearing: normal - bilateral.  Vision: no vision problems.   Dental: regular dental visits.        Health  Symptoms include: none    Advanced Care Planning  Do you have an advanced directive? no  Do you have a durable medical power of ? no     Review of Systems:     Review of Systems   Constitutional:  Negative for chills and fever.   HENT:  Negative for ear pain and sore throat.    Eyes:  Negative for pain and visual disturbance.   Respiratory:  Negative for cough and shortness of breath.    Cardiovascular:  Negative for chest pain and palpitations.   Gastrointestinal:  Negative for abdominal pain, constipation, diarrhea and vomiting.   Genitourinary:  Negative  for dysuria and hematuria.   Musculoskeletal:  Negative for arthralgias and back pain.   Skin:  Negative for color change and rash.   Neurological:  Negative for seizures and syncope.   All other systems reviewed and are negative.     Past Medical History:     Past Medical History:   Diagnosis Date   • Allergic    • Diarrhea 11/29/2021   • Fever 11/29/2021   • Hyperkalemia 08/08/2016   • Hypertension    • Osteoarthritis of left knee 04/10/2020   • Situational anxiety 09/27/2018   • Snoring    • SOB (shortness of breath) 12/01/2021      Past Surgical History:     Past Surgical History:   Procedure Laterality Date   • ANTERIOR CRUCIATE LIGAMENT REPAIR Left    • SHOULDER SURGERY Right     R SHOULDER IMPINGEMENT SURGERY      Family History:     Family History   Problem Relation Age of Onset   • Hypertension Mother    • Hyperlipidemia Mother    • Colon cancer Father       Social History:     Social History     Socioeconomic History   • Marital status: Single     Spouse name: None   • Number of children: None   • Years of education: None   • Highest education level: None   Occupational History   • None   Tobacco Use   • Smoking status: Never     Passive exposure: Never   • Smokeless tobacco: Never   Vaping Use   • Vaping status: Never Used   Substance and Sexual Activity   • Alcohol use: Yes     Comment: occasional   • Drug use: No   • Sexual activity: Yes     Partners: Female   Other Topics Concern   • None   Social History Narrative    MARITAL STATUS:  AS PER [employer]    HAS CHILDREN    HAND DOMINANCE: RIGHT     Social Determinants of Health     Financial Resource Strain: Low Risk  (1/12/2022)    Overall Financial Resource Strain (CARDIA)    • Difficulty of Paying Living Expenses: Not hard at all   Food Insecurity: No Food Insecurity (2/24/2021)    Hunger Vital Sign    • Worried About Running Out of Food in the Last Year: Never true    • Ran Out of Food in the Last Year: Never true   Transportation Needs: No  Transportation Needs (1/12/2022)    PRAPARE - Transportation    • Lack of Transportation (Medical): No    • Lack of Transportation (Non-Medical): No   Physical Activity: Sufficiently Active (1/12/2022)    Exercise Vital Sign    • Days of Exercise per Week: 4 days    • Minutes of Exercise per Session: 60 min   Stress: No Stress Concern Present (1/12/2022)    [employer] [place] [employer] - Occupational Stress Questionnaire    • Feeling of Stress : Not at all   Social Connections: Moderately Integrated (1/12/2022)    Social Connection and Isolation Panel [NHANES]    • Frequency of Communication with Friends and Family: More than three times a week    • Frequency of Social Gatherings with Friends and Family: More than three times a week    • Attends Voodoo Services: More than 4 times per year    • Active Member of Clubs or Organizations: No    • Attends Club or Organization Meetings: Never    • Marital Status:    Intimate Partner Violence: Not At Risk (1/12/2022)    Humiliation, Afraid, Rape, and Kick questionnaire    • Fear of Current or Ex-Partner: No    • Emotionally Abused: No    • Physically Abused: No    • Sexually Abused: No   Housing Stability: Unknown (1/12/2022)    Housing Stability Vital Sign    • Unable to Pay for Housing in the Last Year: No    • Number of Places Lived in the Last Year: Not on file    • Unstable Housing in the Last Year: No      Current Medications:     Current Outpatient Medications   Medication Sig Dispense Refill   • Multiple Vitamin (MULTI-VITAMIN DAILY PO) Take 1 capsule by mouth     • Testosterone 12.5 MG/ACT (1%) GEL 2 pumps daily     • atorvastatin (LIPITOR) 20 mg tablet TAKE 1 TABLET DAILY (Patient not taking: Reported on 2/2/2024) 90 tablet 3     No current facility-administered medications for this visit.      Allergies:     Allergies   Allergen Reactions   • No Known Allergies       Physical Exam:     /80 (BP Location: Left arm, Patient Position:  "Sitting)   Pulse 68   Temp (!) 97.1 °F (36.2 °C) (Tympanic)   Ht 5' 11\" (1.803 m)   Wt 98.3 kg (216 lb 12.8 oz)   SpO2 97%   BMI 30.24 kg/m²     Physical Exam  Vitals and nursing note reviewed.   Constitutional:       General: He is not in acute distress.     Appearance: He is well-developed.   HENT:      Head: Normocephalic and atraumatic.      Right Ear: Tympanic membrane normal. There is no impacted cerumen.      Left Ear: Tympanic membrane normal. There is no impacted cerumen.      Nose: No rhinorrhea.      Mouth/Throat:      Pharynx: No posterior oropharyngeal erythema.   Eyes:      General:         Right eye: No discharge.         Left eye: No discharge.      Conjunctiva/sclera: Conjunctivae normal.   Cardiovascular:      Rate and Rhythm: Normal rate and regular rhythm.      Heart sounds: No murmur heard.  Pulmonary:      Effort: Pulmonary effort is normal. No respiratory distress.      Breath sounds: Normal breath sounds.   Abdominal:      Palpations: Abdomen is soft.      Tenderness: There is no abdominal tenderness.   Musculoskeletal:         General: No swelling.      Cervical back: Neck supple.   Skin:     General: Skin is warm and dry.      Capillary Refill: Capillary refill takes less than 2 seconds.      Findings: No rash.   Neurological:      Mental Status: He is alert and oriented to person, place, and time.   Psychiatric:         Mood and Affect: Mood normal.          Antoine Llanes MD  [facility]"

## 2024-02-04 NOTE — ASSESSMENT & PLAN NOTE
Chronic ,well controlled   Anti -reflex measures :  · Raise the head of the bed 4 to 6 inches.   · Avoid smoking, alcohol  · Avoid excess coffee, tea or other caffeinated beverages.   · Avoid garments that fit tightly through the abdomen.   · Avoid eating before bed.   · Weight loss is beneficial.

## 2024-02-04 NOTE — ASSESSMENT & PLAN NOTE
Chronic asymptomatic fair control blood work from August 2023 reviewed with the patient patient currently has not been taking his medication recommend to restart atorvastatin 20 mg once a day low-fat diet discussed with the patient

## 2024-02-04 NOTE — ASSESSMENT & PLAN NOTE
Patient follow-up with the urology currently on testosterone gel per urology will defer the treatment to us and patient asked if we continue to care for this condition and we will check testosterone levels free and total with the next blood work

## 2024-02-09 ENCOUNTER — OFFICE VISIT (OUTPATIENT)
Dept: CARDIOLOGY CLINIC | Facility: CLINIC | Age: 59
End: 2024-02-09
Payer: COMMERCIAL

## 2024-02-09 VITALS
BODY MASS INDEX: 30.38 KG/M2 | HEART RATE: 64 BPM | WEIGHT: 217 LBS | HEIGHT: 71 IN | DIASTOLIC BLOOD PRESSURE: 78 MMHG | SYSTOLIC BLOOD PRESSURE: 120 MMHG | OXYGEN SATURATION: 97 %

## 2024-02-09 DIAGNOSIS — E78.2 MIXED HYPERLIPIDEMIA: Primary | ICD-10-CM

## 2024-02-09 DIAGNOSIS — I10 ESSENTIAL HYPERTENSION: ICD-10-CM

## 2024-02-09 DIAGNOSIS — I49.3 PVC (PREMATURE VENTRICULAR CONTRACTION): ICD-10-CM

## 2024-02-09 DIAGNOSIS — R55 SYNCOPE, UNSPECIFIED SYNCOPE TYPE: ICD-10-CM

## 2024-02-09 PROCEDURE — 99214 OFFICE O/P EST MOD 30 MIN: CPT | Performed by: INTERNAL MEDICINE

## 2024-02-09 NOTE — PROGRESS NOTES
Cardiology Follow Up    Masoud Pina  [id number]  1965  [facility] [place]  [address]  [address]  [place] [address] [phone]      1. Mixed hyperlipidemia  Lipid Panel with Direct LDL reflex      2. Syncope, unspecified syncope type  Ambulatory Referral to Cardiology      3. PVC (premature ventricular contraction)        4. Essential hypertension            Discussion/Summary:    Most recent episode of syncope sounds orthostatic in nature. He was probably dehydrated. A little more alcohol than usual, and he awoke in the middle of the night which is not typical for him when blood pressure tends to be lower. His workup was benign. Echocardiogram remains normal. He had no significant arrhythmias noted on his Holter monitor. He has not had any recurrent symptoms and is otherwise asymptomatic. Recommend hydration. Conservative management and consideration of longer-term monitoring if there is recurrence of symptoms.    We reviewed his lipid panel. He has not been consistent with his statin. His last LDL was 108. He has no other major risk factors. He is not opposed to taking the medication, but only wants to take it if really necessary. At this point I recommended checking a lipid panel now which will essentially be his numbers off of all therapy. If his LDL is controlled, and his 10-year ASCVD risk is low enough, then we can stop the statin. If his numbers are borderline he may be able to be on just 10 of atorvastatin. If he does need to 20, he would not necessarily be opposed to it I did discuss that in general this is a safe medication and not sure where his friend heard that it was bad for the heart.      Previous History:    History of PVCs. Benign testing.    Returns to see me today because in December about 2 months ago, he had an episode of syncope.    She was in [place] on a work trip. Had a few more alcoholic  beverages than typical, but nothing excessive. He went to bed at 9:00. Woke up in the middle of the night to go to the restroom which is not always usual for him, either. In the bathroom, he passed out lost consciousness fell and had facial injury. He was able to complete the rest of his trip without any other significant issues. He felt normal the other day. He has felt fine since. He continues to go to the gym exercises regularly and has had no recurrent episodes of presyncope, or syncope.    Repeat testing was done by his primary care physician at the end of December with an echocardiogram and another Holter monitor both of which were unremarkable.    This now brings his total episodes of syncope to 3 total. The first was when he was sick with COVID and he was dehydrated at that time, as well.      The other question has been his cholesterol medication. He was a little concerned because some of his friends told him the medication may not be good. He has not been taking it very regularly at all. When blood work was done in August, he was may be taking it once a week. His LDL was 108.      Patient Active Problem List   Diagnosis    Essential hypertension    Hyperlipidemia    Hypogonadotropic hypogonadism syndrome, male (HCC)    Obesity (BMI 30-39.9)    Situational anxiety    Dyspepsia    Encounter for well adult exam with abnormal findings    Chronic pain of left knee    Family history of colon cancer in father    Osteoarthritis of left knee    PVC (premature ventricular contraction)    Rib pain on left side    IFG (impaired fasting glucose)    Leucocytosis    COVID-19 virus infection    COVID-19 vaccine series not completed    Educated about COVID-19 virus infection    Polyp of colon    Syncope    Fall    Nose pain     Past Medical History:   Diagnosis Date    Allergic     Diarrhea 11/29/2021    Fever 11/29/2021    Hyperkalemia 08/08/2016    Hypertension     Osteoarthritis of left knee 04/10/2020    Situational  "anxiety 09/27/2018    Snoring     SOB (shortness of breath) 12/01/2021     Social History     Tobacco Use    Smoking status: Never     Passive exposure: Never    Smokeless tobacco: Never   Vaping Use    Vaping status: Never Used   Substance Use Topics    Alcohol use: Yes     Comment: occasional    Drug use: No      Family History   Problem Relation Age of Onset    Hypertension Mother     Hyperlipidemia Mother     Colon cancer Father      Past Surgical History:   Procedure Laterality Date    ANTERIOR CRUCIATE LIGAMENT REPAIR Left     SHOULDER SURGERY Right     R SHOULDER IMPINGEMENT SURGERY       Current Outpatient Medications:     atorvastatin (LIPITOR) 20 mg tablet, TAKE 1 TABLET DAILY, Disp: 90 tablet, Rfl: 3    Multiple Vitamin (MULTI-VITAMIN DAILY PO), Take 1 capsule by mouth, Disp: , Rfl:     Testosterone 12.5 MG/ACT (1%) GEL, 2 pumps daily, Disp: , Rfl:   Allergies   Allergen Reactions    No Known Allergies        Vitals:    02/09/24 0933   BP: 120/78   BP Location: Left arm   Patient Position: Sitting   Cuff Size: Standard   Pulse: 64   SpO2: 97%   Weight: 98.4 kg (217 lb)   Height: 5' 11\" (1.803 m)     Vitals:    02/09/24 0933   Weight: 98.4 kg (217 lb)      Height: 5' 11\" (180.3 cm)   Body mass index is 30.27 kg/m².    Physical Exam:  GEN: Masoud Pina appears well, alert and oriented x 3, pleasant and cooperative   HEENT: pupils equal, round, and reactive to light; extraocular muscles intact  NECK: supple, no carotid bruits   HEART: regular rhythm, normal S1 and S2, no murmurs, clicks, gallops or rubs   LUNGS: clear to auscultation bilaterally; no wheezes, rales, or rhonchi   ABDOMEN: normal bowel sounds, soft, no tenderness, no distention  EXTREMITIES: peripheral pulses normal; no clubbing, cyanosis, or edema  NEURO: no focal findings   SKIN: normal without suspicious lesions on exposed skin      ROS:  Except as noted in HPI, is otherwise reviewed in detail and a 12 point review of systems is " negative.  ROS reviewed and is unchanged    Labs:  Reviewed in care everywhere    Testing:    Holter 12/29/23:  IMPRESSION:  This 48 hour Holter showed normal sinus rhythm throughout the study.  There was one 9 beat run of atrial tachycardia at 158 BPM.  There were infrequent PAC's (0.1% total beats).  There was no atrial fibrillation or atrial flutter.  There were two episodes of palpitations recorded that correlated with periods of normal sinus rhythm.  Echo 12/29/23:  Left Ventricle: Left ventricular cavity size is normal. Wall thickness is normal. The left ventricular ejection fraction is 60%. Systolic function is normal. Wall motion is normal. Diastolic function is normal.    Right Ventricle: Right ventricular cavity size is normal. Systolic function is normal.    Tricuspid Valve: There is trace regurgitation.    Prior TTE study available for comparison. Prior study date: 9/29/2020. No significant changes noted compared to the prior study.    Stress test 11/12/2020:  STRESS SUMMARY: Duration of exercise was 10 min. The patient exercised to protocol stage 4. Maximal work rate was 13.3 METs. Functional capacity was normal. Maximal heart rate during stress was 160 bpm ( 98 % of maximal predicted heart  rate). The heart rate response to stress was normal. There was normal resting blood pressure with an appropriate response to stress. The rate-pressure product for the peak heart rate and blood pressure was 71427. There was no chest pain  during stress. The stress test was terminated due to achievement of maximal (symptom limited) exercise and fatigue. The stress ECG was negative for ischemia. There was upsloping ST-depressions with significant baseline variation. There  were no stress arrhythmias or conduction abnormalities. Based on [employer] Treadmill Scoring, this patient was at low risk for cardiac events.     SUMMARY:  -  Rest ECG: Normal sinus rhythm.  -  Stress results: Duration of exercise was 10 min. Maximal  work rate was 13.3 METs. Functional capacity was normal. Target heart rate was achieved. There was no chest pain during stress.  -  ECG conclusions: The stress ECG was negative for ischemia. There was upsloping ST-depressions with significant baseline variation. There were no stress arrhythmias or conduction abnormalities. Based on [employer] Treadmill Scoring, this  patient was at low risk for cardiac events.     IMPRESSIONS: Normal study after maximal exercise without reproduction of symptoms.    Echo 9/29/20:  LEFT VENTRICLE:  Systolic function was normal. Ejection fraction was estimated to be 60 %.  There were no regional wall motion abnormalities.     RIGHT VENTRICLE:  The size was normal.  Systolic function was normal.     MITRAL VALVE:  There was trace regurgitation.     TRICUSPID VALVE:  There was trace regurgitation.  Pulmonary artery systolic pressure was within the normal range.    Holter 9/29/20:  The patient was in sinus rhythm throughout the recording.     Minimum HR: 45  Average HR: 67  Maximum HR: 115     Premature ventricular contractions: 1242 (<1%)  Ventricular runs: 0     Supraventricular contractions: 830 (<1%)  Supraventricular runs: 1, lasting 5 beats     Longest RR: 1.4 sec     Arrhythmias: none     Diary submitted: yes, but no symptoms were reported

## 2024-04-05 ENCOUNTER — OFFICE VISIT (OUTPATIENT)
Dept: OBGYN CLINIC | Facility: MEDICAL CENTER | Age: 59
End: 2024-04-05
Payer: COMMERCIAL

## 2024-04-05 VITALS
BODY MASS INDEX: 30.41 KG/M2 | WEIGHT: 217.2 LBS | HEIGHT: 71 IN | SYSTOLIC BLOOD PRESSURE: 144 MMHG | DIASTOLIC BLOOD PRESSURE: 76 MMHG | HEART RATE: 60 BPM

## 2024-04-05 DIAGNOSIS — M17.12 PRIMARY OSTEOARTHRITIS OF LEFT KNEE: Primary | ICD-10-CM

## 2024-04-05 PROCEDURE — 99213 OFFICE O/P EST LOW 20 MIN: CPT | Performed by: ORTHOPAEDIC SURGERY

## 2024-04-05 PROCEDURE — 20610 DRAIN/INJ JOINT/BURSA W/O US: CPT | Performed by: ORTHOPAEDIC SURGERY

## 2024-04-05 RX ORDER — TRIAMCINOLONE ACETONIDE 40 MG/ML
40 INJECTION, SUSPENSION INTRA-ARTICULAR; INTRAMUSCULAR
Status: COMPLETED | OUTPATIENT
Start: 2024-04-05 | End: 2024-04-05

## 2024-04-05 RX ORDER — LIDOCAINE HYDROCHLORIDE 10 MG/ML
3 INJECTION, SOLUTION INFILTRATION; PERINEURAL
Status: COMPLETED | OUTPATIENT
Start: 2024-04-05 | End: 2024-04-05

## 2024-04-05 RX ADMIN — TRIAMCINOLONE ACETONIDE 40 MG: 40 INJECTION, SUSPENSION INTRA-ARTICULAR; INTRAMUSCULAR at 09:45

## 2024-04-05 RX ADMIN — LIDOCAINE HYDROCHLORIDE 3 ML: 10 INJECTION, SOLUTION INFILTRATION; PERINEURAL at 09:45

## 2024-04-05 NOTE — PROGRESS NOTES
Assessment/Plan:  1. Primary osteoarthritis of left knee      No orders of the defined types were placed in this encounter.    Masoud has severe left knee osteoarthritis   The decision was made to proceed with a repeat left knee CSI  Left knee CSI was performed in the office without complication   Post injection instructions were reviewed  The CSI may be repeated on a 3 month basis as needed  He may continue to take NSAID's as needed for pain control   Information was provided and discussed regarding a left total knee arthroplasty     No follow-ups on file.    I answered all of the patient's questions during the visit and provided education of the patient's condition during the visit.  The patient verbalized understanding of the information given and agrees with the plan.  This note was dictated using [employer] software.  It may contain errors including improperly dictated words.  Please contact physician directly for any questions.    Subjective   Chief Complaint: No chief complaint on file.      HPI  Masoud Pina is a 58 y.o. male who presents for follow up for left knee osteoarthritis. He underwent a left knee CSI on 7/7/23. He notes that the CSI was beneficial for him until a few months ago, around January. He notes pain to the medial aspect of his left knee. Pain will worsen with walking. He will take Aleve as needed for pain control. He works out at the gym and also performs a HEP.      Review of Systems  ROS:    See HPI for musculoskeletal review.   All other systems reviewed are negative     History:  Past Medical History:   Diagnosis Date    Allergic     Diarrhea 11/29/2021    Fever 11/29/2021    Hyperkalemia 08/08/2016    Hypertension     Osteoarthritis of left knee 04/10/2020    Situational anxiety 09/27/2018    Snoring     SOB (shortness of breath) 12/01/2021     Past Surgical History:   Procedure Laterality Date    ANTERIOR CRUCIATE LIGAMENT REPAIR Left     SHOULDER SURGERY Right     R SHOULDER  "IMPINGEMENT SURGERY     Social History   Social History     Substance and Sexual Activity   Alcohol Use Yes    Comment: occasional     Social History     Substance and Sexual Activity   Drug Use No     Social History     Tobacco Use   Smoking Status Never    Passive exposure: Never   Smokeless Tobacco Never     Family History:   Family History   Problem Relation Age of Onset    Hypertension Mother     Hyperlipidemia Mother     Colon cancer Father        Current Outpatient Medications on File Prior to Visit   Medication Sig Dispense Refill    Multiple Vitamin (MULTI-VITAMIN DAILY PO) Take 1 capsule by mouth      Testosterone 12.5 MG/ACT (1%) GEL 2 pumps daily       No current facility-administered medications on file prior to visit.     Allergies   Allergen Reactions    No Known Allergies         Objective     /76   Pulse 60   Ht 5' 11\" (1.803 m)   Wt 98.5 kg (217 lb 3.2 oz)   BMI 30.29 kg/m²      PE:  AAOx 3  WDWN  Hearing intact, no drainage from eyes  no audible wheezing  no abdominal distension  LE compartments soft, skin intact    Ortho Exam:  left Knee:   No erythema  no swelling  no effusion  no warmth  AROM: 0-125   Valgus deformity       Large joint arthrocentesis: L knee  Universal Protocol:  Consent: Verbal consent obtained. Written consent not obtained.  Risks and benefits: risks, benefits and alternatives were discussed  Consent given by: patient  Patient understanding: patient states understanding of the procedure being performed  Site marked: the operative site was marked  Patient identity confirmed: verbally with patient  Supporting Documentation  Indications: pain   Procedure Details  Location: knee - L knee  Preparation: Patient was prepped and draped in the usual sterile fashion  Needle size: 22 G  Ultrasound guidance: no  Medications administered: 3 mL lidocaine 1 %; 40 mg triamcinolone acetonide 40 mg/mL    Patient tolerance: patient tolerated the procedure well with no immediate " complications  Dressing:  Sterile dressing applied        Scribe Attestation      I,:  Liz Arias am acting as a scribe while in the presence of the attending physician.:       I,:  Elva Mohamud DO personally performed the services described in this documentation    as scribed in my presence.:

## 2024-05-07 DIAGNOSIS — E23.0 HYPOGONADOTROPIC HYPOGONADISM SYNDROME, MALE (HCC): Primary | ICD-10-CM

## 2024-05-07 RX ORDER — TESTOSTERONE 10 MG/G
GEL TOPICAL
OUTPATIENT
Start: 2024-05-07

## 2024-05-07 NOTE — TELEPHONE ENCOUNTER
Patient called in and needs a refill on Testosterone 12.5 MG/ACT (1%) GEL .    Please send to [facility] on file per patients request.    Thank you.

## 2024-05-17 RX ORDER — TESTOSTERONE 10 MG/G
GEL TOPICAL
Qty: 75 G | Refills: 5 | Status: SHIPPED | OUTPATIENT
Start: 2024-05-17

## 2024-05-17 NOTE — TELEPHONE ENCOUNTER
"Pt called refill line requesting update on Testosterone refill status. I informed him that it appeared Dr. Llanes had denied the refill since he receives this script from another provider Regino Cruz DO. Pt states that per last OV 2/2, he discussed his Testosterone script with Dr. Llanes and she stated that she would take over treatment.     *OV NOTE on 2/2*  \"Patient follow-up with the urology currently on testosterone gel per urology will defer the treatment to us and patient asked if we continue to care for this condition and we will check testosterone levels free and total with the next blood work\" - Dr. Llanes    Please send in refill for Testosterone to [facility].    Reason for call:   [x] Refill   [] Prior Auth  [] Other:     Office:   [x] PCP/Provider - [facility]   [] Specialty/Provider -     Medication:   Testosterone 12.5 MG/ACT (1%) GEL - 2 pumps daily     Pharmacy:   [facility] - [place], [place] - [address] [place] [address]     Does the patient have enough for 3 days?   [x] Yes   [] No - Send as HP to POD    "

## 2024-08-02 ENCOUNTER — OFFICE VISIT (OUTPATIENT)
Dept: FAMILY MEDICINE CLINIC | Facility: CLINIC | Age: 59
End: 2024-08-02
Payer: COMMERCIAL

## 2024-08-02 VITALS
TEMPERATURE: 95.9 F | DIASTOLIC BLOOD PRESSURE: 84 MMHG | OXYGEN SATURATION: 96 % | WEIGHT: 214 LBS | HEART RATE: 65 BPM | BODY MASS INDEX: 29.96 KG/M2 | SYSTOLIC BLOOD PRESSURE: 120 MMHG | HEIGHT: 71 IN

## 2024-08-02 DIAGNOSIS — R94.4 DECREASED GFR: Primary | ICD-10-CM

## 2024-08-02 DIAGNOSIS — Z13.228 SCREENING FOR METABOLIC DISORDER: ICD-10-CM

## 2024-08-02 DIAGNOSIS — Z13.29 SCREENING FOR THYROID DISORDER: ICD-10-CM

## 2024-08-02 DIAGNOSIS — E78.2 MIXED HYPERLIPIDEMIA: ICD-10-CM

## 2024-08-02 DIAGNOSIS — R73.01 IFG (IMPAIRED FASTING GLUCOSE): ICD-10-CM

## 2024-08-02 PROCEDURE — 99214 OFFICE O/P EST MOD 30 MIN: CPT | Performed by: FAMILY MEDICINE

## 2024-08-02 PROCEDURE — 3725F SCREEN DEPRESSION PERFORMED: CPT | Performed by: FAMILY MEDICINE

## 2024-08-02 NOTE — ASSESSMENT & PLAN NOTE
New diagnosis finding on recent blood work decrease in the GFR compared with before with down to 69 discussed with patient well hydration avoid NSAIDs plan to repeat the BMP and UA

## 2024-08-02 NOTE — PROGRESS NOTES
Ambulatory Visit  Name: Masoud Pina      : 1965      MRN: [id number]  Encounter Provider: Antoine Llanes MD  Encounter Date: 2024   Encounter department: [facility]    Assessment & Plan   1. Decreased GFR  Assessment & Plan:  New diagnosis finding on recent blood work decrease in the GFR compared with before with down to 69 discussed with patient well hydration avoid NSAIDs plan to repeat the BMP and UA  Orders:  -     CBC and differential; Future  -     Comprehensive metabolic panel; Future  -     Urinalysis with microscopic; Future  -     CBC and differential  -     Comprehensive metabolic panel  -     Urinalysis with microscopic  2. IFG (impaired fasting glucose)  Assessment & Plan:  Chronic asymptomatic fair control continue low-carb diet encourage patient with his lifestyle modification  Orders:  -     CBC and differential; Future  -     Comprehensive metabolic panel; Future  -     CBC and differential  -     Comprehensive metabolic panel  3. Mixed hyperlipidemia  Assessment & Plan:  Chronic asymptomatic fair control low-fat diet discussed with the patient  Orders:  -     CBC and differential; Future  -     Comprehensive metabolic panel; Future  -     Lipid Panel with Direct LDL reflex; Future  -     CBC and differential  -     Comprehensive metabolic panel  -     Lipid Panel with Direct LDL reflex  4. Screening for thyroid disorder  -     TSH, 3rd generation with Free T4 reflex; Future  -     TSH, 3rd generation with Free T4 reflex  5. Screening for metabolic disorder  -     Comprehensive metabolic panel; Future  -     Comprehensive metabolic panel       History of Present Illness     Patient here follow-up with a chronic condition compliant with the medication tolerated well without side effect recent blood work reviewed with the patient        Review of Systems   Constitutional:  Negative for chills and fever.   HENT:  Negative for ear pain and sore throat.   "  Eyes:  Negative for pain and visual disturbance.   Respiratory:  Negative for cough and shortness of breath.    Cardiovascular:  Negative for chest pain and palpitations.   Gastrointestinal:  Negative for abdominal pain, constipation, diarrhea and vomiting.   Genitourinary:  Negative for dysuria and hematuria.   Musculoskeletal:  Negative for arthralgias and back pain.   Skin:  Negative for color change and rash.   Neurological:  Negative for seizures and syncope.   All other systems reviewed and are negative.      Objective     /84 (BP Location: Left arm, Patient Position: Sitting)   Pulse 65   Temp (!) 95.9 °F (35.5 °C) (Tympanic)   Ht 5' 11\" (1.803 m)   Wt 97.1 kg (214 lb)   SpO2 96%   BMI 29.85 kg/m²     Physical Exam  Vitals and nursing note reviewed.   Constitutional:       General: He is not in acute distress.     Appearance: He is well-developed. He is not diaphoretic.   HENT:      Head: Normocephalic.      Right Ear: Tympanic membrane and external ear normal.      Left Ear: Tympanic membrane and external ear normal.      Nose: No rhinorrhea.      Mouth/Throat:      Pharynx: No posterior oropharyngeal erythema.   Eyes:      General:         Right eye: No discharge.         Left eye: No discharge.      Conjunctiva/sclera: Conjunctivae normal.   Neck:      Vascular: No JVD.   Cardiovascular:      Rate and Rhythm: Normal rate and regular rhythm.      Heart sounds: Normal heart sounds. No murmur heard.     No gallop.   Pulmonary:      Effort: Pulmonary effort is normal. No respiratory distress.      Breath sounds: Normal breath sounds. No stridor. No wheezing or rales.   Chest:      Chest wall: No tenderness.   Abdominal:      General: There is no distension.      Palpations: Abdomen is soft. There is no mass.      Tenderness: There is no abdominal tenderness. There is no rebound.   Musculoskeletal:         General: No tenderness.      Cervical back: Normal range of motion and neck supple. "   Lymphadenopathy:      Cervical: No cervical adenopathy.   Skin:     General: Skin is warm.      Findings: No erythema or rash.   Neurological:      Mental Status: He is alert and oriented to person, place, and time.       Administrative Statements

## 2024-08-02 NOTE — ASSESSMENT & PLAN NOTE
Chronic asymptomatic fair control continue low-carb diet encourage patient with his lifestyle modification

## 2024-08-05 LAB
ALBUMIN SERPL-MCNC: 4.1 G/DL (ref 3.5–5.7)
ALP SERPL-CCNC: 52 U/L (ref 35–120)
ALT SERPL-CCNC: 14 U/L
ANION GAP SERPL CALCULATED.3IONS-SCNC: 8 MMOL/L (ref 3–11)
AST SERPL-CCNC: 12 U/L
BASOPHILS # BLD AUTO: 0.1 THOU/CMM (ref 0–0.1)
BASOPHILS NFR BLD AUTO: 1 %
BILIRUB SERPL-MCNC: 0.5 MG/DL (ref 0.2–1)
BUN SERPL-MCNC: 18 MG/DL (ref 7–28)
CALCIUM SERPL-MCNC: 9.1 MG/DL (ref 8.5–10.1)
CHLORIDE SERPL-SCNC: 106 MMOL/L (ref 100–109)
CHOLEST SERPL-MCNC: 174 MG/DL
CHOLEST/HDLC SERPL: 3.6 {RATIO}
CO2 SERPL-SCNC: 26 MMOL/L (ref 21–31)
CREAT SERPL-MCNC: 0.87 MG/DL (ref 0.53–1.3)
CYTOLOGY CMNT CVX/VAG CYTO-IMP: NORMAL
DIFFERENTIAL METHOD BLD: NORMAL
EOSINOPHIL # BLD AUTO: 0.2 THOU/CMM (ref 0–0.5)
EOSINOPHIL NFR BLD AUTO: 2 %
ERYTHROCYTE [DISTWIDTH] IN BLOOD BY AUTOMATED COUNT: 13.9 % (ref 12–16)
GFR/BSA.PRED SERPLBLD CYS-BASED-ARV: 99 ML/MIN/{1.73_M2}
GLUCOSE SERPL-MCNC: 89 MG/DL (ref 65–99)
GLUCOSE UR QL STRIP: NEGATIVE MG/DL
HCT VFR BLD AUTO: 46.2 % (ref 37–48)
HDLC SERPL-MCNC: 49 MG/DL (ref 23–92)
HGB BLD-MCNC: 15.4 G/DL (ref 12.5–17)
HGB UR QL STRIP: NEGATIVE MG/DL
KETONES UR QL STRIP: NEGATIVE MG/DL
LDLC SERPL CALC-MCNC: 106 MG/DL
LEUKOCYTE ESTERASE UR QL STRIP: NEGATIVE /UL
LYMPHOCYTES # BLD AUTO: 2.7 THOU/CMM (ref 1–3)
LYMPHOCYTES NFR BLD AUTO: 36 %
MCH RBC QN AUTO: 31.2 PG (ref 27–36)
MCHC RBC AUTO-ENTMCNC: 33.4 G/DL (ref 32–37)
MCV RBC AUTO: 93 FL (ref 80–100)
MONOCYTES # BLD AUTO: 0.8 THOU/CMM (ref 0.3–1)
MONOCYTES NFR BLD AUTO: 10 %
MUCOUS THREADS URNS QL MICRO: NORMAL
NEUTROPHILS # BLD AUTO: 3.8 THOU/CMM (ref 1.8–7.8)
NEUTROPHILS NFR BLD AUTO: 51 %
NITRITE UR QL STRIP: NEGATIVE
NONHDLC SERPL-MCNC: 125 MG/DL
PH UR: 5 [PH] (ref 4.5–8)
PLATELET # BLD AUTO: 184 THOU/CMM (ref 140–350)
PMV BLD REES-ECKER: 10.6 FL (ref 7.5–11.3)
POTASSIUM SERPL-SCNC: 4.8 MMOL/L (ref 3.5–5.2)
PROT 24H UR-MRATE: NEGATIVE MG/DL
PROT SERPL-MCNC: 6.7 G/DL (ref 6.3–8.3)
RBC # BLD AUTO: 4.95 MILL/CMM (ref 4–5.4)
RBC #/AREA URNS HPF: NORMAL /HPF (ref 0–2)
SL AMB POCT URINE COMMENT: NORMAL
SODIUM SERPL-SCNC: 140 MMOL/L (ref 135–145)
SP GR UR: 1.02 (ref 1–1.03)
TRIGL SERPL-MCNC: 95 MG/DL
TSH SERPL-ACNC: 1.45 UIU/ML (ref 0.45–5.33)
WBC # BLD AUTO: 7.5 THOU/CMM (ref 4–10.5)
WBC #/AREA URNS HPF: NORMAL /HPF (ref 0–5)

## 2024-12-13 DIAGNOSIS — E23.0 HYPOGONADOTROPIC HYPOGONADISM SYNDROME, MALE (HCC): ICD-10-CM

## 2024-12-13 NOTE — TELEPHONE ENCOUNTER
Reason for call:   [x] Refill   [] Prior Auth  [] Other:     Office:   [x] PCP/Provider - Antoine Llanes MD   [] Specialty/Provider -     Medication:     Testosterone 12.5 MG/ACT (1%) GEL       Dose/Frequency: 2 pumps daily,     Quantity: 75 g    Pharmacy: [facility] - [place], [place] - [address] [place] [address]     Does the patient have enough for 3 days?   [x] Yes   [] No - Send as HP to POD

## 2024-12-15 RX ORDER — TESTOSTERONE GEL, 1% 10 MG/G
GEL TRANSDERMAL
Qty: 75 G | Refills: 2 | Status: SHIPPED | OUTPATIENT
Start: 2024-12-15

## 2025-01-24 ENCOUNTER — OFFICE VISIT (OUTPATIENT)
Dept: FAMILY MEDICINE CLINIC | Facility: CLINIC | Age: 60
End: 2025-01-24
Payer: COMMERCIAL

## 2025-01-24 VITALS
HEART RATE: 81 BPM | WEIGHT: 219 LBS | TEMPERATURE: 96.8 F | BODY MASS INDEX: 30.66 KG/M2 | SYSTOLIC BLOOD PRESSURE: 148 MMHG | OXYGEN SATURATION: 96 % | HEIGHT: 71 IN | DIASTOLIC BLOOD PRESSURE: 90 MMHG

## 2025-01-24 DIAGNOSIS — Z13.220 SCREENING, LIPID: ICD-10-CM

## 2025-01-24 DIAGNOSIS — E23.0 HYPOGONADOTROPIC HYPOGONADISM SYNDROME, MALE (HCC): ICD-10-CM

## 2025-01-24 DIAGNOSIS — I10 UNCONTROLLED HYPERTENSION: ICD-10-CM

## 2025-01-24 DIAGNOSIS — R10.10 PAIN OF UPPER ABDOMEN: Primary | ICD-10-CM

## 2025-01-24 DIAGNOSIS — G47.09 OTHER INSOMNIA: ICD-10-CM

## 2025-01-24 DIAGNOSIS — Z13.29 SCREENING FOR THYROID DISORDER: ICD-10-CM

## 2025-01-24 DIAGNOSIS — Z12.5 SCREENING PSA (PROSTATE SPECIFIC ANTIGEN): ICD-10-CM

## 2025-01-24 PROCEDURE — 99214 OFFICE O/P EST MOD 30 MIN: CPT | Performed by: FAMILY MEDICINE

## 2025-01-26 PROBLEM — G47.09 OTHER INSOMNIA: Status: ACTIVE | Noted: 2025-01-26

## 2025-01-26 PROBLEM — R10.10 PAIN OF UPPER ABDOMEN: Status: ACTIVE | Noted: 2025-01-26

## 2025-01-26 NOTE — ASSESSMENT & PLAN NOTE
Chronic patient already evaluated by urology previously currently on testosterone gel  Orders:  •  CBC and differential; Future  •  Comprehensive metabolic panel; Future  •  Lipase; Future  •  Amylase; Future

## 2025-01-26 NOTE — ASSESSMENT & PLAN NOTE
Chronic today uncontrolled asymptomatic patient going through stress at work and not sleeping well recommend low-salt diet important lose weight will continue to monitor blood pressure at home and will reevaluate if persistent to be elevated to start medication  Orders:  •  CBC and differential; Future  •  Comprehensive metabolic panel; Future  •  Urinalysis with microscopic; Future  •  Lipase; Future  •  Amylase; Future

## 2025-01-26 NOTE — PROGRESS NOTES
Name: Masoud Pina      : 1965      MRN: [id number]  Encounter Provider: Antoine Llanes MD  Encounter Date: 2025   Encounter department: [facility]  :  Assessment & Plan  Pain of upper abdomen  New diagnosis acute symptomatic no red flag symptom positive tenderness in the right upper Collatyl and left upper Collatyl on physical exam recommend to do CMP lipase amylase CBC recommend to do CAT scan of the abdomen without contrast will follow-up with the result accordingly in case get worse to go to emergency room  Orders:  •  CT chest and abdomen wo contrast; Future  •  CBC and differential; Future  •  Comprehensive metabolic panel; Future  •  Lipase; Future  •  Amylase; Future    Other insomnia  New diagnosis symptomatic secondary to stress-induced from work discussed with the patient sleeping hygiene  Orders:  •  CBC and differential; Future  •  Comprehensive metabolic panel; Future  •  Lipase; Future  •  Amylase; Future    Uncontrolled hypertension  Chronic today uncontrolled asymptomatic patient going through stress at work and not sleeping well recommend low-salt diet important lose weight will continue to monitor blood pressure at home and will reevaluate if persistent to be elevated to start medication  Orders:  •  CBC and differential; Future  •  Comprehensive metabolic panel; Future  •  Urinalysis with microscopic; Future  •  Lipase; Future  •  Amylase; Future    Hypogonadotropic hypogonadism syndrome, male (HCC)  Chronic patient already evaluated by urology previously currently on testosterone gel  Orders:  •  CBC and differential; Future  •  Comprehensive metabolic panel; Future  •  Lipase; Future  •  Amylase; Future    Screening, lipid    Orders:  •  Lipid Panel with Direct LDL reflex; Future    Screening for thyroid disorder    Orders:  •  TSH, 3rd generation with Free T4 reflex; Future    Screening PSA (prostate specific antigen)    Orders:  •  PSA, Total  "Screen; Future           History of Present Illness   Patient here has multiple concern concerned about pain in his abdomen on the right and the left upper quadrant of has been going on for the last couple weeks no nausea no vomiting no weight change no change in the diet he gained weight he been under stress and not eating well and not sleeping well denies any constipation or diarrhea no blood in the stool no fever also patient been concerned about sleep he been doing going through stress and work and not sleeping well feel tired during the day      Review of Systems   Constitutional:  Negative for chills and fever.   HENT:  Negative for ear pain and sore throat.    Eyes:  Negative for pain and visual disturbance.   Respiratory:  Negative for cough and shortness of breath.    Cardiovascular:  Negative for chest pain and palpitations.   Gastrointestinal:  Positive for abdominal pain. Negative for constipation, diarrhea and vomiting.   Genitourinary:  Negative for dysuria and hematuria.   Musculoskeletal:  Negative for gait problem.   Skin:  Negative for color change and rash.   Neurological:  Negative for seizures and syncope.   Psychiatric/Behavioral:  Positive for sleep disturbance.    All other systems reviewed and are negative.      Objective   /90 (BP Location: Left arm, Patient Position: Sitting, Cuff Size: Standard)   Pulse 81   Temp (!) 96.8 °F (36 °C) (Tympanic)   Ht 5' 11\" (1.803 m)   Wt 99.3 kg (219 lb)   SpO2 96%   BMI 30.54 kg/m²      Physical Exam  Vitals and nursing note reviewed.   Constitutional:       General: He is not in acute distress.     Appearance: He is well-developed. He is not diaphoretic.   HENT:      Head: Normocephalic.      Right Ear: Tympanic membrane and external ear normal.      Left Ear: Tympanic membrane and external ear normal.      Nose: No rhinorrhea.      Mouth/Throat:      Pharynx: No posterior oropharyngeal erythema.   Eyes:      General:         Right eye: No " discharge.         Left eye: No discharge.      Conjunctiva/sclera: Conjunctivae normal.   Neck:      Vascular: No JVD.   Cardiovascular:      Rate and Rhythm: Normal rate and regular rhythm.      Heart sounds: Normal heart sounds. No murmur heard.     No gallop.   Pulmonary:      Effort: Pulmonary effort is normal. No respiratory distress.      Breath sounds: Normal breath sounds. No wheezing.   Abdominal:      General: There is no distension.      Palpations: Abdomen is soft.      Tenderness: There is abdominal tenderness in the right upper quadrant and left upper quadrant. There is no right CVA tenderness, left CVA tenderness, guarding or rebound.   Musculoskeletal:         General: No tenderness.      Cervical back: Normal range of motion and neck supple.   Lymphadenopathy:      Cervical: No cervical adenopathy.   Skin:     General: Skin is warm.      Findings: No rash.   Neurological:      Mental Status: He is alert and oriented to person, place, and time.

## 2025-01-26 NOTE — ASSESSMENT & PLAN NOTE
New diagnosis symptomatic secondary to stress-induced from work discussed with the patient sleeping hygiene  Orders:  •  CBC and differential; Future  •  Comprehensive metabolic panel; Future  •  Lipase; Future  •  Amylase; Future

## 2025-01-26 NOTE — ASSESSMENT & PLAN NOTE
New diagnosis acute symptomatic no red flag symptom positive tenderness in the right upper Collatyl and left upper Collatyl on physical exam recommend to do CMP lipase amylase CBC recommend to do CAT scan of the abdomen without contrast will follow-up with the result accordingly in case get worse to go to emergency room  Orders:  •  CT chest and abdomen wo contrast; Future  •  CBC and differential; Future  •  Comprehensive metabolic panel; Future  •  Lipase; Future  •  Amylase; Future

## 2025-01-27 LAB
ALBUMIN SERPL-MCNC: 4.3 G/DL (ref 3.5–5.7)
ALP SERPL-CCNC: 58 U/L (ref 35–120)
ALT SERPL-CCNC: 21 U/L
AMYLASE SERPL-CCNC: 28 U/L
ANION GAP SERPL CALCULATED.3IONS-SCNC: 9 MMOL/L (ref 3–11)
AST SERPL-CCNC: 17 U/L
BASOPHILS # BLD AUTO: 0.1 THOU/CMM (ref 0–0.1)
BASOPHILS NFR BLD AUTO: 1 %
BILIRUB SERPL-MCNC: 0.6 MG/DL (ref 0.2–1)
BUN SERPL-MCNC: 16 MG/DL (ref 7–28)
CALCIUM SERPL-MCNC: 9.3 MG/DL (ref 8.5–10.5)
CHLORIDE SERPL-SCNC: 102 MMOL/L (ref 100–109)
CHOLEST SERPL-MCNC: 260 MG/DL
CHOLEST/HDLC SERPL: 5.4 {RATIO}
CO2 SERPL-SCNC: 28 MMOL/L (ref 21–31)
CREAT SERPL-MCNC: 0.95 MG/DL (ref 0.53–1.3)
CYTOLOGY CMNT CVX/VAG CYTO-IMP: NORMAL
DIFFERENTIAL METHOD BLD: ABNORMAL
EOSINOPHIL # BLD AUTO: 0.1 THOU/CMM (ref 0–0.5)
EOSINOPHIL NFR BLD AUTO: 1 %
ERYTHROCYTE [DISTWIDTH] IN BLOOD BY AUTOMATED COUNT: 13.6 % (ref 12–16)
GFR/BSA.PRED SERPLBLD CYS-BASED-ARV: 91 ML/MIN/{1.73_M2}
GLUCOSE SERPL-MCNC: 92 MG/DL (ref 65–99)
GLUCOSE UR QL STRIP: NEGATIVE MG/DL
HCT VFR BLD AUTO: 48.9 % (ref 37–48)
HDLC SERPL-MCNC: 48 MG/DL (ref 23–92)
HGB BLD-MCNC: 16.2 G/DL (ref 12.5–17)
HGB UR QL STRIP: NEGATIVE MG/DL
KETONES UR QL STRIP: NEGATIVE MG/DL
LDLC SERPL CALC-MCNC: 186 MG/DL
LEUKOCYTE ESTERASE UR QL STRIP: NEGATIVE /UL
LIPASE SERPL-CCNC: 28 U/L (ref 11–82)
LYMPHOCYTES # BLD AUTO: 2.5 THOU/CMM (ref 1–3)
LYMPHOCYTES NFR BLD AUTO: 35 %
MCH RBC QN AUTO: 31 PG (ref 27–36)
MCHC RBC AUTO-ENTMCNC: 33.2 G/DL (ref 32–37)
MCV RBC AUTO: 94 FL (ref 80–100)
MONOCYTES # BLD AUTO: 0.8 THOU/CMM (ref 0.3–1)
MONOCYTES NFR BLD AUTO: 10 %
NEUTROPHILS # BLD AUTO: 3.9 THOU/CMM (ref 1.8–7.8)
NEUTROPHILS NFR BLD AUTO: 53 %
NITRITE UR QL STRIP: NEGATIVE
NONHDLC SERPL-MCNC: 212 MG/DL
PH UR: 6 [PH] (ref 4.5–8)
PLATELET # BLD AUTO: 198 THOU/CMM (ref 140–350)
PMV BLD REES-ECKER: 10.2 FL (ref 7.5–11.3)
POTASSIUM SERPL-SCNC: 4.5 MMOL/L (ref 3.5–5.2)
PROT 24H UR-MRATE: NEGATIVE MG/DL
PROT SERPL-MCNC: 7.1 G/DL (ref 6.3–8.3)
PSA SERPL-MCNC: 0.57 NG/ML
RBC # BLD AUTO: 5.23 MILL/CMM (ref 4–5.4)
RBC #/AREA URNS HPF: NORMAL /HPF (ref 0–2)
SL AMB POCT URINE COMMENT: NORMAL
SODIUM SERPL-SCNC: 139 MMOL/L (ref 135–145)
SP GR UR: 1.02 (ref 1–1.03)
SQUAMOUS #/AREA URNS HPF: NORMAL /LPF (ref 0–5)
TRIGL SERPL-MCNC: 131 MG/DL
TSH SERPL-ACNC: 1.48 UIU/ML (ref 0.45–5.33)
WBC # BLD AUTO: 7.4 THOU/CMM (ref 4–10.5)
WBC #/AREA URNS HPF: NORMAL /HPF (ref 0–5)

## 2025-01-30 ENCOUNTER — HOSPITAL ENCOUNTER (OUTPATIENT)
Dept: RADIOLOGY | Facility: IMAGING CENTER | Age: 60
End: 2025-01-30
Payer: COMMERCIAL

## 2025-01-30 DIAGNOSIS — R10.10 PAIN OF UPPER ABDOMEN: ICD-10-CM

## 2025-01-30 PROCEDURE — 74150 CT ABDOMEN W/O CONTRAST: CPT

## 2025-01-30 PROCEDURE — 71250 CT THORAX DX C-: CPT

## 2025-02-07 ENCOUNTER — RESULTS FOLLOW-UP (OUTPATIENT)
Dept: FAMILY MEDICINE CLINIC | Facility: CLINIC | Age: 60
End: 2025-02-07

## 2025-02-11 ENCOUNTER — OFFICE VISIT (OUTPATIENT)
Dept: FAMILY MEDICINE CLINIC | Facility: CLINIC | Age: 60
End: 2025-02-11
Payer: COMMERCIAL

## 2025-02-11 VITALS
BODY MASS INDEX: 31.02 KG/M2 | HEIGHT: 71 IN | DIASTOLIC BLOOD PRESSURE: 70 MMHG | HEART RATE: 68 BPM | SYSTOLIC BLOOD PRESSURE: 132 MMHG | OXYGEN SATURATION: 97 % | TEMPERATURE: 95.9 F | WEIGHT: 221.6 LBS

## 2025-02-11 DIAGNOSIS — E78.2 MIXED HYPERLIPIDEMIA: ICD-10-CM

## 2025-02-11 DIAGNOSIS — E66.811 CLASS 1 OBESITY WITH SERIOUS COMORBIDITY AND BODY MASS INDEX (BMI) OF 30.0 TO 30.9 IN ADULT, UNSPECIFIED OBESITY TYPE: ICD-10-CM

## 2025-02-11 DIAGNOSIS — R91.1 LUNG NODULE SEEN ON IMAGING STUDY: ICD-10-CM

## 2025-02-11 DIAGNOSIS — K42.9 PERIUMBILICAL HERNIA: ICD-10-CM

## 2025-02-11 DIAGNOSIS — Z00.01 ENCOUNTER FOR WELL ADULT EXAM WITH ABNORMAL FINDINGS: Primary | ICD-10-CM

## 2025-02-11 DIAGNOSIS — R71.8 HIGH HEMATOCRIT: ICD-10-CM

## 2025-02-11 PROBLEM — Z15.2 CLASS 1 OBESITY DUE TO DISRUPTION OF MC4R PATHWAY WITH SERIOUS COMORBIDITY AND BODY MASS INDEX (BMI) OF 30.0 TO 30.9 IN ADULT: Status: ACTIVE | Noted: 2018-07-11

## 2025-02-11 PROBLEM — E88.82 CLASS 1 OBESITY DUE TO DISRUPTION OF MC4R PATHWAY WITH SERIOUS COMORBIDITY AND BODY MASS INDEX (BMI) OF 30.0 TO 30.9 IN ADULT: Status: ACTIVE | Noted: 2018-07-11

## 2025-02-11 PROCEDURE — 99214 OFFICE O/P EST MOD 30 MIN: CPT | Performed by: FAMILY MEDICINE

## 2025-02-11 PROCEDURE — 99396 PREV VISIT EST AGE 40-64: CPT | Performed by: FAMILY MEDICINE

## 2025-02-11 RX ORDER — ATORVASTATIN CALCIUM 10 MG/1
10 TABLET, FILM COATED ORAL DAILY
Qty: 90 TABLET | Refills: 1 | Status: SHIPPED | OUTPATIENT
Start: 2025-02-11

## 2025-02-11 NOTE — ASSESSMENT & PLAN NOTE
Chronic asymptomatic  discussed with the patient to start atorvastatin 10 mg once a day proper use and possible side effect discussed the patient  Orders:  •  atorvastatin (LIPITOR) 10 mg tablet; Take 1 tablet (10 mg total) by mouth daily  •  Comprehensive metabolic panel; Future  •  Lipid Panel with Direct LDL reflex; Future

## 2025-02-11 NOTE — ASSESSMENT & PLAN NOTE
BMI Counseling: Body mass index is 30.91 kg/m². The BMI is above normal. Nutrition recommendations include reducing portion sizes, 3-5 servings of fruits/vegetables daily, and consuming healthier snacks. Exercise recommendations include moderate aerobic physical activity for 150 minutes/week.   Orders:  •  Comprehensive metabolic panel; Future

## 2025-02-11 NOTE — ASSESSMENT & PLAN NOTE
Advice and education were given regarding nutrition, aerobic exercises, weight-bearing exercises, cardiovascular risk reduction, fall risk reduction, and age-appropriate supplements.     The patient was counseled regarding instructions for management, risk factor reductions, prognosis, risks and benefits of treatment options, patient and family education, and importance of compliance with treatment.  Orders:  •  Comprehensive metabolic panel; Future

## 2025-02-11 NOTE — PROGRESS NOTES
Adult Annual Physical  Name: Masoud Pina      : 1965      MRN: [id number]  Encounter Provider: Antoine Llanes MD  Encounter Date: 2025   Encounter department: [facility]    Assessment & Plan  Encounter for well adult exam with abnormal findings  Advice and education were given regarding nutrition, aerobic exercises, weight-bearing exercises, cardiovascular risk reduction, fall risk reduction, and age-appropriate supplements.     The patient was counseled regarding instructions for management, risk factor reductions, prognosis, risks and benefits of treatment options, patient and family education, and importance of compliance with treatment.  Orders:  •  Comprehensive metabolic panel; Future    Class 1 obesity with serious comorbidity and body mass index (BMI) of 30.0 to 30.9 in adult, unspecified obesity type  BMI Counseling: Body mass index is 30.91 kg/m². The BMI is above normal. Nutrition recommendations include reducing portion sizes, 3-5 servings of fruits/vegetables daily, and consuming healthier snacks. Exercise recommendations include moderate aerobic physical activity for 150 minutes/week.   Orders:  •  Comprehensive metabolic panel; Future    Lung nodule seen on imaging study  Incidental finding 4 mm pulmonary nodule in left lung ,plan to recheck ctscan in 1 y  Orders:  •  Comprehensive metabolic panel; Future    Mixed hyperlipidemia  Chronic asymptomatic  discussed with the patient to start atorvastatin 10 mg once a day proper use and possible side effect discussed the patient  Orders:  •  atorvastatin (LIPITOR) 10 mg tablet; Take 1 tablet (10 mg total) by mouth daily  •  Comprehensive metabolic panel; Future  •  Lipid Panel with Direct LDL reflex; Future    Periumbilical hernia  New diagnosis incidental finding on the CAT scan patient is symptomatic we will continue to monitor  Orders:  •  Comprehensive metabolic panel; Future    High  hematocrit  Slight increase in the hematocrit compared with before patient on testosterone injection but still below 50 we will continue to monitor keep well hydration  Orders:  •  Comprehensive metabolic panel; Future    Immunizations and preventive care screenings were discussed with patient today. Appropriate education was printed on patient's after visit summary.    Discussed risks and benefits of prostate cancer screening. We discussed the controversial history of PSA screening for prostate cancer in the [place] as well as the risk of over detection and over treatment of prostate cancer by way of PSA screening.  The patient understands that PSA blood testing is an imperfect way to screen for prostate cancer and that elevated PSA levels in the blood may also be caused by infection, inflammation, prostatic trauma or manipulation, urological procedures, or by benign prostatic enlargement.    The role of the digital rectal examination in prostate cancer screening was also discussed and I discussed with him that there is large interobserver variability in the findings of digital rectal examination.    Counseling:  Alcohol/drug use: discussed moderation in alcohol intake, the recommendations for healthy alcohol use, and avoidance of illicit drug use.  Dental Health: discussed importance of regular tooth brushing, flossing, and dental visits.  Injury prevention: discussed safety/seat belts, safety helmets, smoke detectors, carbon monoxide detectors, and smoking near bedding or upholstery.  Sexual health: discussed sexually transmitted diseases, partner selection, use of condoms, avoidance of unintended pregnancy, and contraceptive alternatives.  Exercise: the importance of regular exercise/physical activity was discussed. Recommend exercise 3-5 times per week for at least 30 minutes.          History of Present Illness     Adult Annual Physical:  Patient presents for annual physical.     Diet and Physical  "Activity:  - Diet/Nutrition:. no special diet  - Exercise: no formal exercise.    Depression Screening:  - PHQ-2 Score: 0    General Health:  - Sleep: sleeps well.  - Hearing: normal hearing bilateral ears.  - Vision: wears glasses.  - Dental: regular dental visits.    Review of Systems   Constitutional:  Negative for chills and fever.   HENT:  Negative for ear pain and sore throat.    Eyes:  Negative for pain and visual disturbance.   Respiratory:  Negative for cough and shortness of breath.    Cardiovascular:  Negative for chest pain and palpitations.   Gastrointestinal:  Negative for abdominal pain, constipation, diarrhea and vomiting.   Genitourinary:  Negative for dysuria and hematuria.   Musculoskeletal:  Negative for arthralgias and back pain.   Skin:  Negative for color change and rash.   Neurological:  Negative for seizures and syncope.   All other systems reviewed and are negative.        Objective   /70 (BP Location: Left arm, Patient Position: Sitting)   Pulse 68   Temp (!) 95.9 °F (35.5 °C) (Tympanic)   Ht 5' 11\" (1.803 m)   Wt 101 kg (221 lb 9.6 oz)   SpO2 97%   BMI 30.91 kg/m²     Physical Exam  Vitals and nursing note reviewed.   Constitutional:       General: He is not in acute distress.     Appearance: He is well-developed. He is not diaphoretic.   HENT:      Head: Normocephalic.      Right Ear: Tympanic membrane and external ear normal.      Left Ear: Tympanic membrane and external ear normal.      Nose: No rhinorrhea.      Mouth/Throat:      Pharynx: No posterior oropharyngeal erythema.   Eyes:      General:         Right eye: No discharge.         Left eye: No discharge.      Conjunctiva/sclera: Conjunctivae normal.   Neck:      Vascular: No JVD.   Cardiovascular:      Rate and Rhythm: Normal rate and regular rhythm.      Heart sounds: Normal heart sounds. No murmur heard.     No gallop.   Pulmonary:      Effort: Pulmonary effort is normal. No respiratory distress.      Breath " sounds: Normal breath sounds. No wheezing.   Abdominal:      General: There is no distension.      Palpations: Abdomen is soft.      Tenderness: There is no abdominal tenderness. There is no rebound.   Musculoskeletal:         General: No tenderness.      Cervical back: Normal range of motion and neck supple.   Lymphadenopathy:      Cervical: No cervical adenopathy.   Skin:     General: Skin is warm.      Findings: No rash.   Neurological:      Mental Status: He is alert and oriented to person, place, and time.

## 2025-03-21 ENCOUNTER — APPOINTMENT (OUTPATIENT)
Dept: RADIOLOGY | Facility: MEDICAL CENTER | Age: 60
End: 2025-03-21
Payer: COMMERCIAL

## 2025-03-21 ENCOUNTER — OFFICE VISIT (OUTPATIENT)
Dept: OBGYN CLINIC | Facility: MEDICAL CENTER | Age: 60
End: 2025-03-21
Payer: COMMERCIAL

## 2025-03-21 VITALS — WEIGHT: 218.6 LBS | BODY MASS INDEX: 30.6 KG/M2 | HEIGHT: 71 IN

## 2025-03-21 DIAGNOSIS — M17.12 PRIMARY OSTEOARTHRITIS OF LEFT KNEE: ICD-10-CM

## 2025-03-21 DIAGNOSIS — Z01.89 ENCOUNTER FOR LOWER EXTREMITY COMPARISON IMAGING STUDY: ICD-10-CM

## 2025-03-21 DIAGNOSIS — M17.12 PRIMARY OSTEOARTHRITIS OF LEFT KNEE: Primary | ICD-10-CM

## 2025-03-21 PROCEDURE — 73564 X-RAY EXAM KNEE 4 OR MORE: CPT

## 2025-03-21 PROCEDURE — 20610 DRAIN/INJ JOINT/BURSA W/O US: CPT | Performed by: ORTHOPAEDIC SURGERY

## 2025-03-21 PROCEDURE — 99213 OFFICE O/P EST LOW 20 MIN: CPT | Performed by: ORTHOPAEDIC SURGERY

## 2025-03-21 PROCEDURE — 73560 X-RAY EXAM OF KNEE 1 OR 2: CPT

## 2025-03-21 RX ORDER — BUPIVACAINE HYDROCHLORIDE 2.5 MG/ML
2 INJECTION, SOLUTION INFILTRATION; PERINEURAL
Status: COMPLETED | OUTPATIENT
Start: 2025-03-21 | End: 2025-03-21

## 2025-03-21 RX ORDER — TRIAMCINOLONE ACETONIDE 40 MG/ML
40 INJECTION, SUSPENSION INTRA-ARTICULAR; INTRAMUSCULAR
Status: COMPLETED | OUTPATIENT
Start: 2025-03-21 | End: 2025-03-21

## 2025-03-21 RX ADMIN — BUPIVACAINE HYDROCHLORIDE 2 ML: 2.5 INJECTION, SOLUTION INFILTRATION; PERINEURAL at 08:45

## 2025-03-21 RX ADMIN — TRIAMCINOLONE ACETONIDE 40 MG: 40 INJECTION, SUSPENSION INTRA-ARTICULAR; INTRAMUSCULAR at 08:45

## 2025-03-21 NOTE — PROGRESS NOTES
Name: Masoud Pina      : 1965      MRN: [id number]  Encounter Provider: Elva Mohamud DO  Encounter Date: 3/21/2025   Encounter department: [facility]  :  Assessment & Plan  Primary osteoarthritis of left knee  Patient has severe left knee osteoarthritis.   Treatment options were discussed with patient today.  Patient is a surgical candidate at this time. He will pick a date for surgery in the fall today.  Injections: Patient received left knee steroid injection today. Tolerated the procedure well. Post injection instructions reviewed including information on glucose monitoring for diabetic patients. Patient aware that they may repeat steroid injection every 3 months if needed.   Medications: Tylenol up to 3000 mg per day  PT: Continue home exercises.   Activity: Continue activity as tolerated.   Patient would like to have left TKA in 2025.  Patient will follow-up in  to discuss/consent for left TKA.  Patient will reserve a surgical date at today's visit.  Plan for next appt: Consent for left TKA.  Orders:    XR knee 4+ vw left injury; Future    Encounter for lower extremity comparison imaging study    Orders:    XR knee 1 or 2 vw right; Future          Return in about 3 months (around 2025) for Left TKA discussion.    I answered all of the patient's questions during the visit and provided education of the patient's condition during the visit.  The patient verbalized understanding of the information given and agrees with the plan.  This note was dictated using [employer] software.  It may contain errors including improperly dictated words.  Please contact physician directly for any questions.    Subjective   Chief Complaint:   Chief Complaint   Patient presents with    Left Knee - Follow-up         History of Present Illness     HPI    Masoud Pina is a 59 y.o. male who presents for follow up for severe left knee osteoarthritis.       Any falls or  trauma associated with onset of pain: no todd  Location of pain: medial  Intermittent or constant: intermittent  Description of pain: achy  Aggravating factors: pivoting, squatting  Instability or locking: no  Pain medication that has been tried: tylenol   Topical mediation that has been tried: yes  Has heat/ice/elevation been tried: yes  Can NSAIDs be taken?  If not why?: yes  Has PT or home exercises been tried?: no  Has bracing been tried? OTC or rx?  no  Have injections been tried?  Steroid/visco?: yes 4/5/24 with almost 1 year of relief.   Any history of surgery on that knee?:  ACL reconstruction 15+ years ago    History of MRSA: no  History of blood clots: no  Family history of blood clots: no  History of Hepatitis C:no  History of HIV: no  Are you a smoker:no  Are you diabetic:no  Do you see a cardiologist: has in the past due to artrial flutter  Have you seen a dentist in the past year: yes  Do you have a spinal cord stimulator: no  Review allergies      Review of Systems  ROS:    See HPI for musculoskeletal review.   All other systems reviewed are negative     History:  Past Medical History:   Diagnosis Date    Allergic     Diarrhea 11/29/2021    Fever 11/29/2021    Hyperkalemia 08/08/2016    Hypertension     Osteoarthritis of left knee 04/10/2020    Situational anxiety 09/27/2018    Snoring 8/8/2016    SOB (shortness of breath) 12/01/2021     Past Surgical History:   Procedure Laterality Date    ANTERIOR CRUCIATE LIGAMENT REPAIR Left     SHOULDER SURGERY Right     R SHOULDER IMPINGEMENT SURGERY     Social History   Social History     Substance and Sexual Activity   Alcohol Use Yes    Comment: occasional     Social History     Substance and Sexual Activity   Drug Use No     Social History     Tobacco Use   Smoking Status Never    Passive exposure: Never   Smokeless Tobacco Never     Family History:   Family History   Problem Relation Age of Onset    Hypertension Mother     Hyperlipidemia Mother     Colon  "cancer Father        Current Outpatient Medications on File Prior to Visit   Medication Sig Dispense Refill    atorvastatin (LIPITOR) 10 mg tablet Take 1 tablet (10 mg total) by mouth daily 90 tablet 1    Multiple Vitamin (MULTI-VITAMIN DAILY PO) Take 1 capsule by mouth      Testosterone 12.5 MG/ACT (1%) GEL 2 pumps daily 75 g 2     No current facility-administered medications on file prior to visit.     Allergies   Allergen Reactions    Pollen Extract Nasal Congestion          Objective   Ht 5' 11\" (1.803 m)   Wt 99.2 kg (218 lb 9.6 oz)   BMI 30.49 kg/m²          PE:  AAOx 3  WDWN  Hearing intact, no drainage from eyes  no audible wheezing  no abdominal distension  LE compartments soft, skin intact    Ortho Exam:  leftknee:    Appearance:  No  swelling   No  ecchymosis  No  obvious joint deformity   No  effusion   Palpation/Tenderness:  no  TTP over medial joint line  No  TTP over lateral joint line   No  TTP over patella  No  TTP over patellar tendon  No  TTP over pes anserine bursa  Active Range of Motion:  AROM: 0-130  Special Tests:  Valgus Stress Test: negative  Varus Stress Test: negative  Medial [facility]: negative  Lateral [facility]: negative  [facility]: negative  Anterior/ Posterior Drawer: negative    Imaging Studies: Results Review Statement: I personally reviewed the following image studies in PACS and associated radiology reports: xray(s). My interpretation of the radiology images/reports is: Severe left knee osteoarthritis s/p washer noted tibial interference screw noted from ACL reconstruction.    Large joint arthrocentesis: L knee  Universal Protocol:  procedure performed by consultantConsent: Verbal consent obtained.  Risks and benefits: risks, benefits and alternatives were discussed  Consent given by: patient  Patient understanding: patient states understanding of the procedure being performed  Site marked: the operative site was marked  Patient identity confirmed: verbally with patient  Supporting " Documentation  Indications: pain   Procedure Details  Location: knee - L knee  Needle size: 22 G  Ultrasound guidance: no  Approach: anterolateral  Medications administered: 2 mL bupivacaine 0.25 %; 40 mg triamcinolone acetonide 40 mg/mL    Patient tolerance: patient tolerated the procedure well with no immediate complications  Dressing:  Sterile dressing applied          Scribe Attestation      I,:  Trino Iyer am acting as a scribe while in the presence of the attending physician.:       I,:  Elva Mohamud DO personally performed the services described in this documentation    as scribed in my presence.:

## 2025-03-21 NOTE — ASSESSMENT & PLAN NOTE
Patient has severe left knee osteoarthritis.   Treatment options were discussed with patient today.  Patient is a surgical candidate at this time. He will pick a date for surgery in the fall today.  Injections: Patient received left knee steroid injection today. Tolerated the procedure well. Post injection instructions reviewed including information on glucose monitoring for diabetic patients. Patient aware that they may repeat steroid injection every 3 months if needed.   Medications: Tylenol up to 3000 mg per day  PT: Continue home exercises.   Activity: Continue activity as tolerated.   Patient would like to have left TKA in fall 2025.  Patient will follow-up in June to discuss/consent for left TKA.  Patient will reserve a surgical date at today's visit.  Plan for next appt: Consent for left TKA.  Orders:    XR knee 4+ vw left injury; Future

## 2025-05-05 LAB
ALBUMIN SERPL-MCNC: 4.2 G/DL (ref 3.5–5.7)
ALP SERPL-CCNC: 53 U/L (ref 35–120)
ALT SERPL-CCNC: 17 U/L
ANION GAP SERPL CALCULATED.3IONS-SCNC: 8 MMOL/L (ref 3–11)
AST SERPL-CCNC: 13 U/L
BILIRUB SERPL-MCNC: 0.5 MG/DL (ref 0.2–1)
BUN SERPL-MCNC: 17 MG/DL (ref 7–28)
CALCIUM SERPL-MCNC: 9.3 MG/DL (ref 8.5–10.5)
CHLORIDE SERPL-SCNC: 107 MMOL/L (ref 100–109)
CHOLEST SERPL-MCNC: 180 MG/DL
CHOLEST/HDLC SERPL: 3.4 {RATIO}
CO2 SERPL-SCNC: 28 MMOL/L (ref 21–31)
CREAT SERPL-MCNC: 0.86 MG/DL (ref 0.53–1.3)
CYTOLOGY CMNT CVX/VAG CYTO-IMP: NORMAL
GFR/BSA.PRED SERPLBLD CYS-BASED-ARV: 99 ML/MIN/{1.73_M2}
GLUCOSE SERPL-MCNC: 96 MG/DL (ref 65–99)
HDLC SERPL-MCNC: 53 MG/DL (ref 23–92)
LDLC SERPL CALC-MCNC: 114 MG/DL
NONHDLC SERPL-MCNC: 127 MG/DL
POTASSIUM SERPL-SCNC: 4.8 MMOL/L (ref 3.5–5.2)
PROT SERPL-MCNC: 6.7 G/DL (ref 6.3–8.3)
SODIUM SERPL-SCNC: 143 MMOL/L (ref 135–145)
TRIGL SERPL-MCNC: 64 MG/DL

## 2025-06-25 ENCOUNTER — OFFICE VISIT (OUTPATIENT)
Dept: FAMILY MEDICINE CLINIC | Facility: CLINIC | Age: 60
End: 2025-06-25
Payer: COMMERCIAL

## 2025-06-25 VITALS
WEIGHT: 218 LBS | TEMPERATURE: 96.8 F | BODY MASS INDEX: 29.53 KG/M2 | SYSTOLIC BLOOD PRESSURE: 130 MMHG | OXYGEN SATURATION: 99 % | HEART RATE: 66 BPM | HEIGHT: 72 IN | DIASTOLIC BLOOD PRESSURE: 84 MMHG

## 2025-06-25 DIAGNOSIS — E78.2 MIXED HYPERLIPIDEMIA: ICD-10-CM

## 2025-06-25 DIAGNOSIS — I10 PRIMARY HYPERTENSION: Primary | ICD-10-CM

## 2025-06-25 DIAGNOSIS — E23.0 HYPOGONADOTROPIC HYPOGONADISM SYNDROME, MALE (HCC): ICD-10-CM

## 2025-06-25 PROCEDURE — 99214 OFFICE O/P EST MOD 30 MIN: CPT | Performed by: FAMILY MEDICINE

## 2025-06-25 RX ORDER — TESTOSTERONE GEL, 1% 10 MG/G
GEL TRANSDERMAL
Qty: 75 G | Refills: 2 | Status: SHIPPED | OUTPATIENT
Start: 2025-06-25

## 2025-06-26 NOTE — ASSESSMENT & PLAN NOTE
Chronic patient already evaluated by urology previously currently on testosterone gel    Orders:  •  Testosterone 12.5 MG/ACT (1%) GEL; 2 pumps daily  •  CBC and differential; Future  •  Comprehensive metabolic panel; Future  •  Lipid panel; Future  •  TSH, 3rd generation with Free T4 reflex; Future

## 2025-06-26 NOTE — ASSESSMENT & PLAN NOTE
Chronic today uncontrolled asymptomatic patient going through stress at work and not sleeping well recommend low-salt diet important lose weight will continue to monitor blood pressure at home and will reevaluate if persistent to be elevated to start medication    Orders:  •  CBC and differential; Future  •  Comprehensive metabolic panel; Future  •  Lipid panel; Future  •  TSH, 3rd generation with Free T4 reflex; Future

## 2025-06-26 NOTE — ASSESSMENT & PLAN NOTE
Chronic asymptomatic improvement in the lipid panel compared to before continue atorvastatin 10 mg once a day proper use and possible side effect discussed the patient    Orders:  •  CBC and differential; Future  •  Comprehensive metabolic panel; Future  •  Lipid panel; Future  •  TSH, 3rd generation with Free T4 reflex; Future

## 2025-06-26 NOTE — PROGRESS NOTES
Name: Masoud Pina      : 1965      MRN: [id number]  Encounter Provider: Antoine Llanes MD  Encounter Date: 2025   Encounter department: [facility] PRIMARY CARE  :  Assessment & Plan  Primary hypertension  Chronic today uncontrolled asymptomatic patient going through stress at work and not sleeping well recommend low-salt diet important lose weight will continue to monitor blood pressure at home and will reevaluate if persistent to be elevated to start medication    Orders:  •  CBC and differential; Future  •  Comprehensive metabolic panel; Future  •  Lipid panel; Future  •  TSH, 3rd generation with Free T4 reflex; Future     Hypogonadotropic hypogonadism syndrome, male (HCC)  Chronic patient already evaluated by urology previously currently on testosterone gel    Orders:  •  Testosterone 12.5 MG/ACT (1%) GEL; 2 pumps daily  •  CBC and differential; Future  •  Comprehensive metabolic panel; Future  •  Lipid panel; Future  •  TSH, 3rd generation with Free T4 reflex; Future     Mixed hyperlipidemia  Chronic asymptomatic improvement in the lipid panel compared to before continue atorvastatin 10 mg once a day proper use and possible side effect discussed the patient    Orders:  •  CBC and differential; Future  •  Comprehensive metabolic panel; Future  •  Lipid panel; Future  •  TSH, 3rd generation with Free T4 reflex; Future       Assessment & Plan           History of Present Illness   Patient is here f/up with chronic condition ,complaint with med tolerated well no new concern recent blood work reviewed       Review of Systems   Constitutional:  Negative for activity change, appetite change, fatigue and fever.   HENT:  Negative for congestion, ear pain, sinus pressure, sinus pain and sore throat.    Eyes:  Negative for discharge and redness.   Respiratory:  Negative for cough, chest tightness and shortness of breath.    Cardiovascular:  Negative for chest pain, palpitations and leg swelling.  "  Gastrointestinal:  Negative for abdominal pain, constipation and diarrhea.   Genitourinary:  Negative for dysuria, flank pain, frequency and hematuria.   Musculoskeletal:  Negative for gait problem.   Skin:  Negative for pallor and rash.   Neurological:  Negative for dizziness, tremors, weakness, numbness and headaches.   Hematological:  Does not bruise/bleed easily.       Objective   /84 (BP Location: Left arm, Patient Position: Sitting, Cuff Size: Standard)   Pulse 66   Temp (!) 96.8 °F (36 °C) (Tympanic)   Ht 5' 11.65\" (1.82 m)   Wt 98.9 kg (218 lb)   SpO2 99%   BMI 29.85 kg/m²      Physical Exam  Vitals and nursing note reviewed.   Constitutional:       General: He is not in acute distress.     Appearance: He is well-developed. He is not diaphoretic.   HENT:      Head: Normocephalic.      Right Ear: Tympanic membrane and external ear normal.      Left Ear: Tympanic membrane and external ear normal.      Nose: No rhinorrhea.      Mouth/Throat:      Pharynx: No posterior oropharyngeal erythema.     Eyes:      General:         Right eye: No discharge.         Left eye: No discharge.      Conjunctiva/sclera: Conjunctivae normal.     Neck:      Vascular: No JVD.     Cardiovascular:      Rate and Rhythm: Normal rate and regular rhythm.      Heart sounds: Normal heart sounds. No murmur heard.     No gallop.   Pulmonary:      Effort: Pulmonary effort is normal. No respiratory distress.      Breath sounds: Normal breath sounds. No wheezing.   Abdominal:      General: There is no distension.      Palpations: Abdomen is soft.      Tenderness: There is no abdominal tenderness. There is no rebound.     Musculoskeletal:         General: No tenderness.      Cervical back: Normal range of motion and neck supple.   Lymphadenopathy:      Cervical: No cervical adenopathy.     Skin:     General: Skin is warm.      Findings: No rash.     Neurological:      Mental Status: He is alert and oriented to person, place, and " time.

## 2025-06-27 VITALS — WEIGHT: 217.8 LBS | BODY MASS INDEX: 29.5 KG/M2 | HEIGHT: 72 IN

## 2025-06-27 DIAGNOSIS — M17.12 PRIMARY OSTEOARTHRITIS OF LEFT KNEE: Primary | ICD-10-CM

## 2025-06-27 PROCEDURE — 99213 OFFICE O/P EST LOW 20 MIN: CPT | Performed by: ORTHOPAEDIC SURGERY

## 2025-06-27 NOTE — ASSESSMENT & PLAN NOTE
Patient has severe left knee osteoarthritis.   Treatment options were discussed with patient today.  Patient is a surgical candidate at this time but he is not having surgery and would like to hold off on surgery or any injections.  Injections: Patient declined CSI of the left knee  Medications: Tylenol up to 3000 mg per day  PT: Continue home exercises.   Activity: Continue activity as tolerated.   Plan for next appt: repeat CSI if needed

## 2025-06-27 NOTE — PROGRESS NOTES
Name: Masoud Pina      : 1965      MRN: [id number]  Encounter Provider: Elva Mohamud DO  Encounter Date: 2025   Encounter department[facility]  :  Assessment & Plan  Primary osteoarthritis of left knee  Patient has severe left knee osteoarthritis.   Treatment options were discussed with patient today.  Patient is a surgical candidate at this time but he is not having surgery and would like to hold off on surgery or any injections.  Injections: Patient declined CSI of the left knee  Medications: Tylenol up to 3000 mg per day  PT: Continue home exercises.   Activity: Continue activity as tolerated.   Plan for next appt: repeat CSI if needed             Return if symptoms worsen or fail to improve.    I answered all of the patient's questions during the visit and provided education of the patient's condition during the visit.  The patient verbalized understanding of the information given and agrees with the plan.  This note was dictated using [employer] software.  It may contain errors including improperly dictated words.  Please contact physician directly for any questions.    Subjective   Chief Complaint:   No chief complaint on file.        History of Present Illness     HPI    Masoud Pina is a 60 y.o. male who presents for follow up for severe left knee osteoarthritis. Patient received a CSI of the left knee on 3/21/25 which is still providing relief. He does not want another CSI and would like delay surgery originally planned for fall       Any falls or trauma associated with onset of pain: no todd  Location of pain: medial  Intermittent or constant: intermittent  Description of pain: achy  Aggravating factors: pivoting, squatting  Instability or locking: no  Pain medication that has been tried: tylenol   Topical mediation that has been tried: yes  Has heat/ice/elevation been tried: yes  Can NSAIDs be taken?  If not why?: yes  Has PT or home  exercises been tried?: no  Has bracing been tried? OTC or rx?  no  Have injections been tried?  Steroid/visco?: yes 4/5/24 with almost 1 year of relief.   Any history of surgery on that knee?:  ACL reconstruction 15+ years ago    History of MRSA: no  History of blood clots: no  Family history of blood clots: no  History of Hepatitis C:no  History of HIV: no  Are you a smoker:no  Are you diabetic:no  Do you see a cardiologist: has in the past due to artrial flutter  Have you seen a dentist in the past year: yes  Do you have a spinal cord stimulator: no  Review allergies      Review of Systems  ROS:    See HPI for musculoskeletal review.   All other systems reviewed are negative     History:  Past Medical History:   Diagnosis Date    Allergic     Diarrhea 11/29/2021    Fever 11/29/2021    Hyperkalemia 08/08/2016    Hypertension     Osteoarthritis of left knee 04/10/2020    Situational anxiety 09/27/2018    Snoring 8/8/2016    SOB (shortness of breath) 12/01/2021     Past Surgical History:   Procedure Laterality Date    ANTERIOR CRUCIATE LIGAMENT REPAIR Left     SHOULDER SURGERY Right     R SHOULDER IMPINGEMENT SURGERY     Social History   Social History     Substance and Sexual Activity   Alcohol Use Yes    Comment: occasional     Social History     Substance and Sexual Activity   Drug Use No     Social History     Tobacco Use   Smoking Status Never    Passive exposure: Never   Smokeless Tobacco Never     Family History:   Family History   Problem Relation Name Age of Onset    Hypertension Mother      Hyperlipidemia Mother      Colon cancer Father         Current Outpatient Medications on File Prior to Visit   Medication Sig Dispense Refill    atorvastatin (LIPITOR) 10 mg tablet Take 1 tablet (10 mg total) by mouth daily 90 tablet 1    Multiple Vitamin (MULTI-VITAMIN DAILY PO) Take 1 capsule by mouth      Testosterone 12.5 MG/ACT (1%) GEL 2 pumps daily 75 g 2     No current facility-administered medications on file  "prior to visit.     Allergies   Allergen Reactions    Pollen Extract Nasal Congestion          Objective   Ht 5' 11.65\" (1.82 m)   Wt 98.8 kg (217 lb 12.8 oz)   BMI 29.83 kg/m²          PE:  AAOx 3  WDWN  Hearing intact, no drainage from eyes  no audible wheezing  no abdominal distension  LE compartments soft, skin intact    Ortho Exam:  leftknee:    Appearance:  No  swelling   No  ecchymosis  No  obvious joint deformity   No  effusion   Palpation/Tenderness:  no  TTP over medial joint line  No  TTP over lateral joint line   No  TTP over patella  No  TTP over patellar tendon  No  TTP over pes anserine bursa  Active Range of Motion:  AROM: 0-130  Special Tests:  Valgus Stress Test: negative  Varus Stress Test: negative  Medial [facility]: negative  Lateral Adán: negative  Lachman: negative  Anterior/ Posterior Drawer: negative    Imaging Studies: Results Review Statement: I personally reviewed the following image studies in PACS and associated radiology reports: xray(s). My interpretation of the radiology images/reports is: Severe left knee osteoarthritis s/p washer noted tibial interference screw noted from ACL reconstruction.    Procedures    Scribe Attestation      I,:  Jose Marie am acting as a scribe while in the presence of the attending physician.:       I,:  Elva Mohamud DO personally performed the services described in this documentation    as scribed in my presence.:               "